# Patient Record
Sex: FEMALE | Race: WHITE | NOT HISPANIC OR LATINO | Employment: OTHER | URBAN - METROPOLITAN AREA
[De-identification: names, ages, dates, MRNs, and addresses within clinical notes are randomized per-mention and may not be internally consistent; named-entity substitution may affect disease eponyms.]

---

## 2017-03-09 ENCOUNTER — HISTORICAL (OUTPATIENT)
Dept: ADMINISTRATIVE | Facility: HOSPITAL | Age: 62
End: 2017-03-09

## 2017-03-10 ENCOUNTER — HISTORICAL (OUTPATIENT)
Dept: ADMINISTRATIVE | Facility: HOSPITAL | Age: 62
End: 2017-03-10

## 2017-03-22 ENCOUNTER — HISTORICAL (OUTPATIENT)
Dept: ADMINISTRATIVE | Facility: HOSPITAL | Age: 62
End: 2017-03-22

## 2017-04-28 ENCOUNTER — HISTORICAL (OUTPATIENT)
Dept: ADMINISTRATIVE | Facility: HOSPITAL | Age: 62
End: 2017-04-28

## 2017-06-09 ENCOUNTER — HISTORICAL (OUTPATIENT)
Dept: ADMINISTRATIVE | Facility: HOSPITAL | Age: 62
End: 2017-06-09

## 2017-06-21 ENCOUNTER — HISTORICAL (OUTPATIENT)
Dept: ADMINISTRATIVE | Facility: HOSPITAL | Age: 62
End: 2017-06-21

## 2018-02-15 ENCOUNTER — HISTORICAL (OUTPATIENT)
Dept: ADMINISTRATIVE | Facility: HOSPITAL | Age: 63
End: 2018-02-15

## 2018-10-06 ENCOUNTER — HISTORICAL (OUTPATIENT)
Dept: ADMINISTRATIVE | Facility: HOSPITAL | Age: 63
End: 2018-10-06

## 2019-01-01 ENCOUNTER — HISTORICAL (OUTPATIENT)
Dept: ADMINISTRATIVE | Facility: HOSPITAL | Age: 64
End: 2019-01-01

## 2019-01-01 LAB
ABS NEUT (OLG): 3.01 X10(3)/MCL (ref 2.1–9.2)
ALBUMIN SERPL-MCNC: 3.5 GM/DL (ref 3.4–5)
ALBUMIN/GLOB SERPL: 0.9 {RATIO}
ALP SERPL-CCNC: 97 UNIT/L (ref 38–126)
ALT SERPL-CCNC: 26 UNIT/L (ref 12–78)
APTT PPP: 72.1 SECOND(S) (ref 24.8–36.9)
AST SERPL-CCNC: 20 UNIT/L (ref 15–37)
BASOPHILS # BLD AUTO: 0 X10(3)/MCL (ref 0–0.2)
BASOPHILS NFR BLD AUTO: 0 %
BILIRUB SERPL-MCNC: 1.5 MG/DL (ref 0.2–1)
BILIRUBIN DIRECT+TOT PNL SERPL-MCNC: 0.3 MG/DL (ref 0–0.2)
BILIRUBIN DIRECT+TOT PNL SERPL-MCNC: 1.2 MG/DL (ref 0–0.8)
BUN SERPL-MCNC: 13 MG/DL (ref 7–18)
CALCIUM SERPL-MCNC: 9.8 MG/DL (ref 8.5–10.1)
CHLORIDE SERPL-SCNC: 104 MMOL/L (ref 98–107)
CO2 SERPL-SCNC: 26 MMOL/L (ref 21–32)
CREAT SERPL-MCNC: 0.93 MG/DL (ref 0.55–1.02)
EOSINOPHIL # BLD AUTO: 0.1 X10(3)/MCL (ref 0–0.9)
EOSINOPHIL NFR BLD AUTO: 1 %
ERYTHROCYTE [DISTWIDTH] IN BLOOD BY AUTOMATED COUNT: 12.9 % (ref 11.5–17)
GLOBULIN SER-MCNC: 3.7 GM/DL (ref 2.4–3.5)
GLUCOSE SERPL-MCNC: 101 MG/DL (ref 74–106)
HCT VFR BLD AUTO: 43.5 % (ref 37–47)
HGB BLD-MCNC: 14.2 GM/DL (ref 12–16)
INR PPP: 1.5 (ref 0–1.3)
LYMPHOCYTES # BLD AUTO: 1.2 X10(3)/MCL (ref 0.6–4.6)
LYMPHOCYTES NFR BLD AUTO: 25 %
MCH RBC QN AUTO: 31.3 PG (ref 27–31)
MCHC RBC AUTO-ENTMCNC: 32.6 GM/DL (ref 33–36)
MCV RBC AUTO: 96 FL (ref 80–94)
MONOCYTES # BLD AUTO: 0.3 X10(3)/MCL (ref 0.1–1.3)
MONOCYTES NFR BLD AUTO: 7 %
NEUTROPHILS # BLD AUTO: 3.01 X10(3)/MCL (ref 2.1–9.2)
NEUTROPHILS NFR BLD AUTO: 66 %
PLATELET # BLD AUTO: 171 X10(3)/MCL (ref 130–400)
PMV BLD AUTO: 8.9 FL (ref 9.4–12.4)
POC TROPONIN: 0.02 NG/ML (ref 0–0.08)
POC TROPONIN: 0.02 NG/ML (ref 0–0.08)
POTASSIUM SERPL-SCNC: 3.8 MMOL/L (ref 3.5–5.1)
PROT SERPL-MCNC: 7.2 GM/DL (ref 6.4–8.2)
PROTHROMBIN TIME: 18.6 SECOND(S) (ref 12.2–14.7)
RBC # BLD AUTO: 4.53 X10(6)/MCL (ref 4.2–5.4)
SODIUM SERPL-SCNC: 138 MMOL/L (ref 136–145)
TROPONIN I SERPL-MCNC: 0.02 NG/ML (ref 0.02–0.49)
WBC # SPEC AUTO: 4.6 X10(3)/MCL (ref 4.5–11.5)

## 2019-05-06 ENCOUNTER — HISTORICAL (OUTPATIENT)
Dept: ADMINISTRATIVE | Facility: HOSPITAL | Age: 64
End: 2019-05-06

## 2019-12-16 ENCOUNTER — HISTORICAL (OUTPATIENT)
Dept: ADMINISTRATIVE | Facility: HOSPITAL | Age: 64
End: 2019-12-16

## 2019-12-17 ENCOUNTER — HISTORICAL (OUTPATIENT)
Dept: ADMINISTRATIVE | Facility: HOSPITAL | Age: 64
End: 2019-12-17

## 2021-03-15 ENCOUNTER — HISTORICAL (OUTPATIENT)
Dept: LAB | Facility: HOSPITAL | Age: 66
End: 2021-03-15

## 2022-03-28 ENCOUNTER — HISTORICAL (OUTPATIENT)
Dept: ADMINISTRATIVE | Facility: HOSPITAL | Age: 67
End: 2022-03-28

## 2022-04-06 ENCOUNTER — HISTORICAL (OUTPATIENT)
Dept: ADMINISTRATIVE | Facility: HOSPITAL | Age: 67
End: 2022-04-06

## 2022-04-06 ENCOUNTER — HISTORICAL (OUTPATIENT)
Dept: RADIOLOGY | Facility: HOSPITAL | Age: 67
End: 2022-04-06

## 2022-04-07 ENCOUNTER — HISTORICAL (OUTPATIENT)
Dept: ADMINISTRATIVE | Facility: HOSPITAL | Age: 67
End: 2022-04-07
Payer: MEDICARE

## 2022-04-21 ENCOUNTER — HISTORICAL (OUTPATIENT)
Dept: RADIOLOGY | Facility: HOSPITAL | Age: 67
End: 2022-04-21
Payer: MEDICARE

## 2022-04-23 VITALS
DIASTOLIC BLOOD PRESSURE: 80 MMHG | HEIGHT: 67 IN | SYSTOLIC BLOOD PRESSURE: 148 MMHG | OXYGEN SATURATION: 96 % | BODY MASS INDEX: 45.99 KG/M2 | WEIGHT: 293 LBS

## 2022-04-30 NOTE — ED PROVIDER NOTES
Patient:   Nay Doe            MRN: [id number]            FIN: [id number]               Age:   63 years     Sex:  Female     :  1955   Associated Diagnoses:   Dyspnea   Author:   Kassie SHARMA, Yousuf Bush      Basic Information   Time seen: Date & time 2019 11:34:00.   History source: EMS.   Arrival mode: Ambulance.   History limitation: None.   Additional information: Patient's physician(s): Bailey SHARMA, Guicho JACKSON.      History of Present Illness   The patient presents with 64y/o F presents to the ED with SOB onset 6 AM. Denies CP. Janet fnp-c and     I, Dr. Fernando, assumed care of this patient upon walking into the room at 1145.  62 y/o  female with hx of AFib, HTN and HLD presents to the ED c/o SOB and palpitations which  began around 0530 this morning and lasted for approximately one hour. Pt also states she has had a cough with green sputum for about one month. Pt denies any chest pain and states that she feels better now. .  The onset was 6  hours ago.  The course/duration of symptoms is improving.  Degree at onset moderate.  Degree at present none.  The Exacerbating factors is exertion.  The Relieving factors is none.  Risk factors consist of hypertension.  Prior episodes: occasional.  Therapy today: emergency medical services.  Associated symptoms: cough.  Additional history: none.        Review of Systems   Constitutional symptoms:  No fever, no chills, no sweats, no weakness, no fatigue.    Skin symptoms:  No rash, no lesion.    Respiratory symptoms:  Shortness of breath, cough, No hemoptysis, , Sputum production: Green.    Cardiovascular symptoms:  Palpitations, no chest pain, no syncope, no peripheral edema.    Gastrointestinal symptoms:  No abdominal pain, no nausea, no vomiting, no diarrhea.    Genitourinary symptoms:  No dysuria, no hematuria.    Musculoskeletal symptoms:  No back pain,    Neurologic symptoms:  No headache, no dizziness, no altered level of  consciousness, no numbness, no tingling, no weakness.              Additional review of systems information: All other systems reviewed and otherwise negative.      Health Status   Allergies:    Allergic Reactions (Selected)  Severity Not Documented  Iodine- No reactions were documented.  Lortab- No reactions were documented.  Sulfamethoxazole- No reactions were documented..   Medications:  (Selected)   Prescriptions  Prescribed  Protonix 40 mg ORAL enteric coated tablet: 40 mg = 1 tab(s), Oral, Daily, # 30 tab(s), 0 Refill(s)  nitroglycerin 0.4 mg sublingual TAB: 0.4 mg = 1 tab(s), SL, q5min, PRN PRN chest pain, # 100 tab(s), 0 Refill(s)  Documented Medications  Documented  Amox Tr-K Clv 875-125 Mg Tab: = 1 tab(s), Oral, BID  Carisoprodol 350 Mg Tablet: 350 mg = 1 tab(s), Oral, BID  Crestor 10 mg oral tablet: 10 mg = 1 tab(s), Oral, At Bedtime, # 30 tab(s), 0 Refill(s)  Doxycycline Hyclate 100 Mg Cap: 100 mg = 1 cap(s), Oral, BID  HYDROCO/APAP TAB 7.5-325: 1 tab(s), Oral, BID  Hctz 12.5mg Cap: 12.5 mg = 1 cap(s), Oral, Daily  Hydroxychloroquine 200 Mg Tab: 200 mg = 1 tab(s), Oral, Daily  Ibuprofen 600 Mg Tablet: 600 mg = 1 tab(s), Oral, TID  Losartan Potassium 50 Mg Tab: 50 mg = 1 tab(s), Oral, Daily  MUPIROCIN 2 % OINT: TOP, TID  Magnesium Oxide 400 Mg Tablet: 400 mg = 1 tab(s), Oral, qPM  OFLOXACIN .3 % SOLN:   PREDNISONE 5 MG TABS: 5 mg = 1 tab(s), Oral, Daily  aspirin 81 mg oral Delayed Release (EC) tablet: 324 mg = 4 tab(s), Oral, Daily, 0 Refill(s)  propafenone 225 mg oral tablet: 225 mg = 1 tab(s), Oral, q8hr, # 90 tab(s), 0 Refill(s).   Immunizations: Unknown.      Past Medical/ Family/ Social History   Medical history:    Active  ATRIAL FIBRILLATION (427.31)  Dyslipidemia (272.4)  HYPERTENSION (997.91)  CPAP (continuous positive airway pressure) dependence ([id number])  Colon cancer screening (4885357990)  Lung nodules ([id number])  Morbid obesity  (902544742)  Allergic rhinitis (711391765)  Depression (977572688)  Elbow pain (598309642)  Wrist pain (15787850)  Shoulder pain (58913649)  Resolved  Osteoarthritis (7544160390):  Resolved.  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  Sleep apnea (263516542):  Resolved.  Anxiety (00176084):  Resolved.  Depression (603057612):  Resolved..   Surgical history:    Colonoscopy on 6/14/2017 at 62 Years.  Comments:  6/14/2017 11:35 - Marcelle Neal RN  auto-populated from documented surgical case  Left Heart Cath w/COR Angio Ventriculogr (None) on 6/11/2014 at 59 Years.  Comments:  6/11/2014 07:36 - Ceasar Rodriguez RN  auto-populated from documented surgical case  Cholecystectomy (73472937).  Appendectomy (003861257).  Ablation (598534693).  Stripping of lower limb varicose veins (56037581)..   Family history:    Diabetes mellitus type 2  Mother  Sister  Stroke  Father  Lymphoma  Mother  Disorder of breast, unspecified  Grandfather  Hypertension.  Father  Mother  Depression.  Other (Aunt)  Hyperlipidemia.  Father  Mother  Melanoma                                                                                                                                                                                                                                09-AUG-2016 15:25:48<$>  Father  .   Social history: Alcohol use: Denies, Tobacco use: Denies, Drug use: Denies, Occupation: On disability, Family/social situation: Lives with relative(s).   Problem list:    Active Problems (14)  Allergic rhinitis   At risk of healthcare associated infection   At risk of pressure sore   ATRIAL FIBRILLATION   Colon cancer screening   CPAP (continuous positive airway pressure) dependence   Depression   Dyslipidemia   Elbow pain   HYPERTENSION   Lung nodules   Morbid obesity   Shoulder pain   Wrist pain .      Physical Examination               Vital Signs   Vital Signs   1/1/2019 11:55 CST       Peripheral Pulse Rate     55 bpm  LOW                              Respiratory Rate          18 br/min                             SpO2                      95 %    1/1/2019 11:30 CST       Temperature Oral          36.8 DegC                             Temperature Oral (calculated)             98.24 DegF                             Peripheral Pulse Rate     55 bpm  LOW                             Respiratory Rate          18 br/min                             SpO2                      98 %                             Oxygen Therapy            Room air                             Systolic Blood Pressure   153 mmHg  HI                             Diastolic Blood Pressure  55 mmHg  LOW  .   Measurements   1/1/2019 11:30 CST       Weight Dosing             150 kg                             Weight Measured and Calculated in Lbs     330.69 lb                             Weight Estimated          150 kg                             Height/Length Dosing      170 cm                             Height/Length Estimated   170 cm                             Body Mass Index Estimated 51.9 kg/m2  .   Basic Oxygen Information   1/1/2019 11:55 CST       SpO2                      95 %    1/1/2019 11:30 CST       SpO2                      98 %                             Oxygen Therapy            Room air  .   General:  No acute distress.   Skin:  Warm, dry, no rash.    Neck:  Supple.   Cardiovascular:  Regular rate and rhythm, No murmur, Normal peripheral perfusion, No edema.    Respiratory:  Breath sounds are equal, Symmetrical chest wall expansion, Respirations: Regular, Breath sounds: Clear.    Chest wall:  No tenderness, No deformity.    Back:  Nontender, Normal range of motion.    Musculoskeletal:  No swelling, no deformity.    Gastrointestinal:  Soft, Nontender, Non distended, Normal bowel sounds.    Neurological:  Alert and oriented to person, place, time, and situation, No focal neurological deficit observed, CN II-XII intact, normal sensory observed, normal motor  observed, normal speech observed, normal coordination observed.    Psychiatric:  Cooperative, appropriate mood & affect.       Medical Decision Making   Differential Diagnosis:  Pneumonia, congestive heart failure, asthma.    Documents reviewed:  Emergency department nurses' notes.   Orders  Launch Orders   Laboratory:  POC BNP iSTAT request (Order): Blood, Routine collect, 1/1/2019 11:35 CST by Margaret Gold Lab Collect, Print Label By Order Location  Troponin-I (Order): Stat collect, 1/1/2019 11:35 CST, Blood, Lab Collect, Print Label By Order Location, 1/1/2019 11:35 CST  POC iSTAT ER Troponin request (Order): Blood, Stat collect, 1/1/2019 11:35 CST by Margaret Gold Lab Collect, Print Label By Order Location  CMP (Order): Stat collect, 1/1/2019 11:35 CST, Blood, Lab Collect, Print Label By Order Location, 1/1/2019 11:35 CST  CBC w/ Auto Diff (Order): Now collect, 1/1/2019 11:35 CST, Blood, Lab Collect, Print Label By Order Location, 1/1/2019 11:35 CST  Radiology:  XR Chest 2 Views (Order): Stat, 1/1/2019 11:35 CST, Dyspnea, None, Patient Bed, Patient Has IV?, Rad Type, Not Scheduled  Cardiology:  EKG Adult 12 Lead (Order): 1/1/2019 11:35 CST, Stat, Once, 1/1/2019 11:35 CST, Patient Bed, Patient Has IV, Standard Precautions, -1, -1, 1/1/2019 11:35 CST.   Electrocardiogram:  Time 1/1/2019 11:37:00, rate 54, No ST-T changes, no ectopy, normal ME & QRS intervals, EP Interp, The Rhythm is atrial fibrillation.  .    Results review:  Lab results : Lab View   1/1/2019 15:21 CST       POC Troponin              0.02 ng/mL    1/1/2019 12:14 CST       POC Troponin              0.02 ng/mL    1/1/2019 11:49 CST       Sodium Lvl                138 mmol/L                             Potassium Lvl             3.8 mmol/L                             Chloride                  104 mmol/L                             CO2                       26.0 mmol/L                             Calcium Lvl                9.8 mg/dL                             Glucose Lvl               101 mg/dL                             BUN                       13.0 mg/dL                             Creatinine                0.93 mg/dL                             eGFR-AA                   >60 mL/min/1.73 m2  NA                             eGFR-KEESHA                  >60 mL/min/1.73 m2  NA                             Bili Total                1.5 mg/dL  HI                             Bili Direct               0.30 mg/dL  HI                             Bili Indirect             1.20 mg/dL  HI                             AST                       20 unit/L                             ALT                       26 unit/L                             Alk Phos                  97 unit/L                             Total Protein             7.2 gm/dL                             Albumin Lvl               3.50 gm/dL                             Globulin                  3.70 gm/dL  HI                             A/G Ratio                 0.9  NA                             Troponin-I                0.02 ng/mL                             PT                        18.6 second(s)  HI                             INR                       1.5  HI                             PTT                       72.1 second(s)  HI                             WBC                       4.6 x10(3)/mcL                             RBC                       4.53 x10(6)/mcL                             Hgb                       14.2 gm/dL                             Hct                       43.5 %                             Platelet                  171 x10(3)/mcL                             MCV                       96.0 fL  HI                             MCH                       31.3 pg  HI                             MCHC                      32.6 gm/dL  LOW                             RDW                       12.9 %                             MPV                       8.9 fL   LOW                             Abs Neut                  3.01 x10(3)/mcL                             Neutro Auto               66 %  NA                             Lymph Auto                25 %  NA                             Mono Auto                 7 %  NA                             Eos Auto                  1 %  NA                             Abs Eos                   0.1 x10(3)/mcL                             Basophil Auto             0 %  NA                             Abs Neutro                3.01 x10(3)/mcL                             Abs Lymph                 1.2 x10(3)/mcL                             Abs Mono                  0.3 x10(3)/mcL                             Abs Baso                  0.0 x10(3)/mcL  .   Radiology results:  Rad Results (ST)  < 12 hrs   Accession: [id number]  Order: XR Chest 2 Views  Report Dt/Tm: 01/01/2019 12:18  Report:   Clinical History  Shortness of breath     Technique  2 views of the chest.     Comparison  June 9, 2017     Findings  Prominent interstitial markings with no focal opacification.  The trachea appears midline.  The cardiomediastinal silhouette is within normal limits.  There is no evidence of pneumothorax or pleural effusion.  Visualized abdomen, soft tissues, and osseous structures are  unremarkable.     Impression  No acute cardiopulmonary process.    .      Reexamination/ Reevaluation   Patient seen and examined. Labs and imaging reviewed as above.   Chest x-ray is within normal limits. On re-evaluation, patient reported no dyspnea in the ED.  Patient remained stable during ED evaluation.   Patient had only a brief episode of dyspnea at home during the period of perceived rapid A. fib.  Patient is anticoagulated and heart rate has been within normal in the ED.  Troponins negative x 2. Patient has appointment to see Dr Del Rosario in 3 days.      Impression and Plan   Diagnosis   Dyspnea ([id number] R06.00)   Plan   Disposition: Discharged: to home.    Patient  was given the following educational materials: Shortness of Breath, Adult.    Follow up with: Follow up with Cardiologist Within 1 to 2 days.    Counseled: Patient, Regarding diagnosis, Regarding diagnostic results, Regarding treatment plan, Patient indicated understanding of instructions.    Orders: Launch Orders   Admit/Transfer/Discharge:  Discharge (Order): 1/1/2019 15:41 CST, Home.    Notes: I, Taylor Jeansonne, acted solely as a scribe for and in the presence of Dr. Fernando who performed the service., I acknowledge that the documentation was provided by a scribe on the date of the service noted above and that the documentation in the chart reflects work and decisions made by me alone. .

## 2022-04-30 NOTE — ED PROVIDER NOTES
Patient:   Nay Doe            MRN: [id number]            FIN: [id number]               Age:   63 years     Sex:  Female     :  1955   Associated Diagnoses:   Acute gastritis   Author:   Mario Stephens MD      Basic Information   Time seen: Date & time 10/6/2018 07:51:00.   History source: Patient.   Arrival mode: Private vehicle.   Additional information: Chief Complaint from Nursing Triage Note : Chief Complaint   10/6/2018 7:31 CDT       Chief Complaint           Left rib pain that started Thursday and progressively getting worse. Denies injury  .      History of Present Illness   The patient presents with abdominal pain.  The onset was 2  days ago and unknown.  The course/duration of symptoms is constant.  The character of symptoms is achy and sharp.  The degree at onset was moderate.  The Location of pain at onset was left, upper and abdominal.  The degree at present is severe.  The Location of pain at present is left, upper and abdominal.  Radiating pain: none. The exacerbating factor is eating.  The relieving factor is rest.  Therapy today: none.  Risk factors consist of none.  Associated symptoms: none.  Additional history: sexual history last intake: 1  day(s) ago.  Patient reports prior EPISODES OF THIS PAIN.  BUT OF LESS INTENSITY.  LAST EPISODE ABOUT A MONTH AGO.        Review of Systems   Constitutional symptoms:  Negative except as documented in HPI.   Skin symptoms:  Negative except as documented in HPI.   Eye symptoms:  Negative except as documented in HPI.   ENMT symptoms:  Negative except as documented in HPI.   Respiratory symptoms:  Negative except as documented in HPI.   Cardiovascular symptoms:  Negative except as documented in HPI.   Gastrointestinal symptoms:  Negative except as documented in HPI.   Genitourinary symptoms:  Negative except as documented in HPI.   Musculoskeletal symptoms:  Negative except as documented in HPI.   Neurologic symptoms:  Negative  except as documented in HPI.   Psychiatric symptoms:  Negative except as documented in HPI.   Endocrine symptoms:  Negative except as documented in HPI.   Hematologic/Lymphatic symptoms:  Negative except as documented in HPI.   Allergy/immunologic symptoms:  Negative except as documented in HPI.             Additional review of systems information: All other systems reviewed and otherwise negative.      Health Status   Allergies:    Allergic Reactions (All)  Severity Not Documented  Iodine- No reactions were documented.  Lortab- No reactions were documented.  Sulfamethoxazole- No reactions were documented.,    Allergies (3) Active Reaction  iodine None Documented  Lortab None Documented  sulfamethoxazole None Documented  .   Medications:  (Selected)   Prescriptions  Prescribed  nitroglycerin 0.4 mg sublingual TAB: 0.4 mg = 1 tab(s), SL, q5min, PRN PRN chest pain, # 100 tab(s), 0 Refill(s)  Documented Medications  Documented  Amox Tr-K Clv 875-125 Mg Tab: = 1 tab(s), Oral, BID  Carisoprodol 350 Mg Tablet: 350 mg = 1 tab(s), Oral, BID  Crestor 10 mg oral tablet: 10 mg = 1 tab(s), Oral, At Bedtime, # 30 tab(s), 0 Refill(s)  Doxycycline Hyclate 100 Mg Cap: 100 mg = 1 cap(s), Oral, BID  HYDROCO/APAP TAB 7.5-325: 1 tab(s), Oral, BID  Hctz 12.5mg Cap: 12.5 mg = 1 cap(s), Oral, Daily  Hydroxychloroquine 200 Mg Tab: 200 mg = 1 tab(s), Oral, Daily  Ibuprofen 600 Mg Tablet: 600 mg = 1 tab(s), Oral, TID  Losartan Potassium 50 Mg Tab: 50 mg = 1 tab(s), Oral, Daily  MUPIROCIN 2 % OINT: TOP, TID  Magnesium Oxide 400 Mg Tablet: 400 mg = 1 tab(s), Oral, qPM  OFLOXACIN .3 % SOLN:   PREDNISONE 5 MG TABS: 5 mg = 1 tab(s), Oral, Daily  aspirin 81 mg oral Delayed Release (EC) tablet: 324 mg = 4 tab(s), Oral, Daily, 0 Refill(s)  propafenone 225 mg oral tablet: 225 mg = 1 tab(s), Oral, q8hr, # 90 tab(s), 0 Refill(s).      Past Medical/ Family/ Social History   Medical history:    Active  ATRIAL FIBRILLATION (427.31)  Dyslipidemia  (272.4)  HYPERTENSION (997.91)  CPAP (continuous positive airway pressure) dependence ([id number])  Colon cancer screening (8185078597)  Lung nodules ([id number])  Morbid obesity (776668690)  Allergic rhinitis (982240919)  Depression (417009888)  Elbow pain (540928709)  Wrist pain (55653579)  Shoulder pain (82347986)  Resolved  Osteoarthritis (7538797463):  Resolved.  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  Sleep apnea (807469189):  Resolved.  Anxiety (05705785):  Resolved.  Depression (787013820):  Resolved..   Surgical history:    Colonoscopy on 6/14/2017 at 62 Years.  Comments:  6/14/2017 11:35 - Marcelle Neal RN  auto-populated from documented surgical case  Left Heart Cath w/COR Angio Ventriculogr (None) on 6/11/2014 at 59 Years.  Comments:  6/11/2014 07:36 - Ceasar Rodriguez RN  auto-populated from documented surgical case  Cholecystectomy (01193448).  Appendectomy (444583528).  Ablation (334883514).  Stripping of lower limb varicose veins (46561544)..   Family history:    Diabetes mellitus type 2  Mother  Sister  Stroke  Father  Lymphoma  Mother  Disorder of breast, unspecified  Grandfather  Hypertension.  Father  Mother  Depression.  Other (Aunt)  Hyperlipidemia.  Father  Mother  Melanoma                                                                                                                                                                                                                                09-AUG-2016 15:25:48<$>  Father  .   Social history:    Social & Psychosocial Habits    Alcohol  05/04/2014 Risk Assessment: Denies Alcohol Use    02/27/2016  Use: Never    Employment/School  06/06/2017  Status: Disabled w/ 10th grade education    Exercise    Comment: Walks w/ cane - 06/06/2017 14:15 - Ingrid Giron LPN    Home/Environment  06/06/2017  Lives with: Son    Living situation: Home/Independent    Home equipment: Glucose  monitoring, Walker/Cane    Alcohol abuse in household: No    Substance abuse in household: No    Smoker in household: No    Injuries/Abuse/Neglect in household: No    Feels unsafe at home: No    Safe place to go: Yes    Agency(s)/Others notified: No    Family/Friends available to help: Yes    Concern for family members at home: No    Major illness in household: No    Financial concerns: No    Concerns over TV/Computer/Game use: No    Nutrition/Health  06/06/2017  Type of diet: Regular    Substance Abuse  05/04/2014 Risk Assessment: Denies Substance Abuse    02/27/2016  Use: Never    Tobacco  05/04/2014 Risk Assessment: Denies Tobacco Use    02/27/2016  Use: Never smoker  .   Problem list:    Active Problems (14)  Allergic rhinitis   At risk of healthcare associated infection   At risk of pressure sore   ATRIAL FIBRILLATION   Colon cancer screening   CPAP (continuous positive airway pressure) dependence   Depression   Dyslipidemia   Elbow pain   HYPERTENSION   Lung nodules   Morbid obesity   Shoulder pain   Wrist pain   .      Physical Examination               Vital Signs   Vital Signs   10/6/2018 7:31 CDT       Temperature Temporal Artery               36.1 DegC  LOW                             Peripheral Pulse Rate     62 bpm                             Respiratory Rate          22 br/min                             SpO2                      96 %                             Oxygen Therapy            Room air                             Systolic Blood Pressure   169 mmHg  HI                             Diastolic Blood Pressure  74 mmHg  .      Vital Signs (last 24 hrs)_____  Last Charted___________  Heart Rate Peripheral   62 bpm  (OCT 06 07:31)  Resp Rate         13 br/min  (OCT 06 09:08)  SBP      H 148mmHg  (OCT 06 09:08)  DBP      68 mmHg  (OCT 06 09:08)  SpO2      97 %  (OCT 06 09:08)  Weight      156.3 kg  (OCT 06 07:31)  .   Measurements   10/6/2018 7:31 CDT       Weight Dosing             156.3 kg                              Weight Measured           156.3 kg                             Weight Measured and Calculated in Lbs     344.58 lb                             Height/Length Dosing      170 cm                             Height/Length Estimated   170 cm  .   Oxygen saturation.   General:  Alert, mild distress.    Jane coma scale:  Per nurse's notes.   Neurological:  Alert and oriented to person, place, time, and situation, normal motor observed, normal speech observed.    Skin:  Warm, dry, pink, intact, normal for ethnicity.    Head:  Normocephalic, atraumatic.    Neck:  Trachea midline.   Eye:  Pupils are equal, round and reactive to light, extraocular movements are intact, normal conjunctiva, vision grossly normal.    Cardiovascular:  Regular rate and rhythm, Normal peripheral perfusion.    Respiratory:  Lungs are clear to auscultation, respirations are non-labored, breath sounds are equal, Symmetrical chest wall expansion.    Chest wall:  No tenderness.   Back:  No step-offs.   Musculoskeletal:  Normal ROM.   Gastrointestinal:  Soft, Non distended, Normal bowel sounds, Obese, Tenderness: Epigastric.    Psychiatric:  Cooperative, appropriate mood & affect.       Medical Decision Making   Differential Diagnosis:  Pancreatitis, irritable bowel syndrome, gastroesophageal reflux disease, peptic ulcer disease, gastritis, diverticulitis.    Rationale:  Patient's laboratory evaluation for abdominal nose unremarkable.  Abdomen symptoms are recurrent..   Results review:     No qualifying data available.      Impression and Plan   Diagnosis   Acute gastritis (ZUX08-GU K29.00)   Plan   Condition: Improved.    Disposition: Discharged: Time  10/6/2018 10:40:00, to home.    Prescriptions: Launch prescriptions   Pharmacy:  Protonix 40 mg ORAL enteric coated tablet (Prescribe): 40 mg = 1 tab(s), Oral, Daily, # 30 tab(s), 0 Refill(s).    Follow up with: Primary Care Physician, In: 3  day(s).    Counseled: Patient, Regarding  diagnosis, Regarding treatment plan, Patient indicated understanding of instructions.    Orders: Launch Orders   Admit/Transfer/Discharge:  Discharge (Order): 10/6/2018 10:44 CDT, Home.

## 2022-06-15 DIAGNOSIS — M48.02 SPINAL STENOSIS IN CERVICAL REGION: Primary | ICD-10-CM

## 2022-06-15 DIAGNOSIS — M48.061 SPINAL STENOSIS, LUMBAR REGION, WITHOUT NEUROGENIC CLAUDICATION: ICD-10-CM

## 2022-08-04 ENCOUNTER — HOSPITAL ENCOUNTER (OUTPATIENT)
Dept: RADIOLOGY | Facility: HOSPITAL | Age: 67
Discharge: HOME | End: 2022-08-04
Attending: NURSE PRACTITIONER
Payer: MEDICARE

## 2022-08-04 DIAGNOSIS — R22.0 LOCALIZED SWELLING, MASS AND LUMP, HEAD: ICD-10-CM

## 2022-08-04 PROCEDURE — 70486 CT MAXILLOFACIAL W/O DYE: CPT | Mod: TC

## 2022-08-08 ENCOUNTER — HOSPITAL ENCOUNTER (OUTPATIENT)
Dept: RADIOLOGY | Facility: HOSPITAL | Age: 67
Discharge: HOME | End: 2022-08-08
Attending: NURSE PRACTITIONER
Payer: MEDICARE

## 2022-08-08 DIAGNOSIS — M48.02 SPINAL STENOSIS IN CERVICAL REGION: ICD-10-CM

## 2022-08-08 DIAGNOSIS — M48.061 SPINAL STENOSIS, LUMBAR REGION, WITHOUT NEUROGENIC CLAUDICATION: ICD-10-CM

## 2022-08-08 PROCEDURE — 72125 CT NECK SPINE W/O DYE: CPT | Mod: TC

## 2022-08-08 PROCEDURE — 72131 CT LUMBAR SPINE W/O DYE: CPT | Mod: TC

## 2022-10-21 ENCOUNTER — HOSPITAL ENCOUNTER (EMERGENCY)
Facility: HOSPITAL | Age: 67
Discharge: LEFT BEFORE DISCHARGE | End: 2022-10-21
Payer: MEDICARE

## 2022-10-21 VITALS
SYSTOLIC BLOOD PRESSURE: 184 MMHG | HEART RATE: 68 BPM | WEIGHT: 293 LBS | DIASTOLIC BLOOD PRESSURE: 74 MMHG | BODY MASS INDEX: 45.99 KG/M2 | HEIGHT: 67 IN | TEMPERATURE: 98 F | RESPIRATION RATE: 16 BRPM | OXYGEN SATURATION: 98 %

## 2022-10-21 PROCEDURE — 25000003 PHARM REV CODE 250: Performed by: NURSE PRACTITIONER

## 2022-10-21 PROCEDURE — 99900041 HC LEFT WITHOUT BEING SEEN- EMERGENCY

## 2022-10-21 RX ORDER — ACETAMINOPHEN 500 MG
1000 TABLET ORAL
Status: COMPLETED | OUTPATIENT
Start: 2022-10-21 | End: 2022-10-21

## 2022-10-21 RX ADMIN — ACETAMINOPHEN 1000 MG: 500 TABLET, FILM COATED ORAL at 01:10

## 2023-01-11 ENCOUNTER — HOSPITAL ENCOUNTER (EMERGENCY)
Facility: HOSPITAL | Age: 68
Discharge: HOME | End: 2023-01-11
Attending: EMERGENCY MEDICINE
Payer: MEDICARE

## 2023-01-11 VITALS
SYSTOLIC BLOOD PRESSURE: 144 MMHG | TEMPERATURE: 98 F | OXYGEN SATURATION: 92 % | WEIGHT: 293 LBS | BODY MASS INDEX: 45.99 KG/M2 | HEART RATE: 60 BPM | RESPIRATION RATE: 23 BRPM | HEIGHT: 67 IN | DIASTOLIC BLOOD PRESSURE: 67 MMHG

## 2023-01-11 DIAGNOSIS — R07.89 CHEST WALL PAIN: ICD-10-CM

## 2023-01-11 DIAGNOSIS — R07.9 CHEST PAIN: ICD-10-CM

## 2023-01-11 DIAGNOSIS — R07.81 PLEURODYNIA: Primary | ICD-10-CM

## 2023-01-11 LAB
APTT PPP: 42.2 SECONDS (ref 23.2–33.7)
BASOPHILS # BLD AUTO: 0.03 X10(3)/MCL (ref 0–0.2)
BASOPHILS NFR BLD AUTO: 0.4 %
D DIMER PPP IA.FEU-MCNC: 0.41 UG/ML FEU (ref 0–0.5)
EOSINOPHIL # BLD AUTO: 0.06 X10(3)/MCL (ref 0–0.9)
EOSINOPHIL NFR BLD AUTO: 0.8 %
ERYTHROCYTE [DISTWIDTH] IN BLOOD BY AUTOMATED COUNT: 12.9 % (ref 11.5–17)
HCT VFR BLD AUTO: 42.5 % (ref 37–47)
HGB BLD-MCNC: 13.8 GM/DL (ref 12–16)
IMM GRANULOCYTES # BLD AUTO: 0.02 X10(3)/MCL (ref 0–0.04)
IMM GRANULOCYTES NFR BLD AUTO: 0.3 %
INR BLD: 2.25 (ref 0–1.3)
LYMPHOCYTES # BLD AUTO: 1.05 X10(3)/MCL (ref 0.6–4.6)
LYMPHOCYTES NFR BLD AUTO: 14.5 %
MCH RBC QN AUTO: 31.4 PG
MCHC RBC AUTO-ENTMCNC: 32.5 MG/DL (ref 33–36)
MCV RBC AUTO: 96.6 FL (ref 80–94)
MONOCYTES # BLD AUTO: 0.62 X10(3)/MCL (ref 0.1–1.3)
MONOCYTES NFR BLD AUTO: 8.6 %
NEUTROPHILS # BLD AUTO: 5.46 X10(3)/MCL (ref 2.1–9.2)
NEUTROPHILS NFR BLD AUTO: 75.4 %
PLATELET # BLD AUTO: 162 X10(3)/MCL (ref 130–400)
PMV BLD AUTO: 8.7 FL (ref 7.4–10.4)
PROTHROMBIN TIME: 24.4 SECONDS (ref 12.5–14.5)
RBC # BLD AUTO: 4.4 X10(6)/MCL (ref 4.2–5.4)
TROPONIN I SERPL-MCNC: <0.01 NG/ML (ref 0–0.04)
WBC # SPEC AUTO: 7.2 X10(3)/MCL (ref 4.5–11.5)

## 2023-01-11 PROCEDURE — 85730 THROMBOPLASTIN TIME PARTIAL: CPT | Performed by: EMERGENCY MEDICINE

## 2023-01-11 PROCEDURE — 85025 COMPLETE CBC W/AUTO DIFF WBC: CPT | Performed by: EMERGENCY MEDICINE

## 2023-01-11 PROCEDURE — 84484 ASSAY OF TROPONIN QUANT: CPT | Performed by: EMERGENCY MEDICINE

## 2023-01-11 PROCEDURE — 93010 EKG 12-LEAD: ICD-10-PCS | Mod: ,,, | Performed by: INTERNAL MEDICINE

## 2023-01-11 PROCEDURE — 93010 ELECTROCARDIOGRAM REPORT: CPT | Mod: ,,, | Performed by: INTERNAL MEDICINE

## 2023-01-11 PROCEDURE — 85610 PROTHROMBIN TIME: CPT | Performed by: EMERGENCY MEDICINE

## 2023-01-11 PROCEDURE — 63600175 PHARM REV CODE 636 W HCPCS: Performed by: EMERGENCY MEDICINE

## 2023-01-11 PROCEDURE — 99285 EMERGENCY DEPT VISIT HI MDM: CPT | Mod: 25

## 2023-01-11 PROCEDURE — 96374 THER/PROPH/DIAG INJ IV PUSH: CPT

## 2023-01-11 PROCEDURE — 36415 COLL VENOUS BLD VENIPUNCTURE: CPT | Performed by: EMERGENCY MEDICINE

## 2023-01-11 PROCEDURE — 85379 FIBRIN DEGRADATION QUANT: CPT | Performed by: EMERGENCY MEDICINE

## 2023-01-11 PROCEDURE — 93005 ELECTROCARDIOGRAM TRACING: CPT

## 2023-01-11 RX ORDER — KETOROLAC TROMETHAMINE 30 MG/ML
15 INJECTION, SOLUTION INTRAMUSCULAR; INTRAVENOUS
Status: COMPLETED | OUTPATIENT
Start: 2023-01-11 | End: 2023-01-11

## 2023-01-11 RX ORDER — HYDROCODONE BITARTRATE AND ACETAMINOPHEN 5; 325 MG/1; MG/1
1 TABLET ORAL EVERY 6 HOURS PRN
Qty: 16 TABLET | Refills: 0 | Status: SHIPPED | OUTPATIENT
Start: 2023-01-11

## 2023-01-11 RX ORDER — ASPIRIN 325 MG
325 TABLET ORAL
Status: DISCONTINUED | OUTPATIENT
Start: 2023-01-11 | End: 2023-01-11

## 2023-01-11 RX ADMIN — KETOROLAC TROMETHAMINE 15 MG: 30 INJECTION, SOLUTION INTRAMUSCULAR; INTRAVENOUS at 07:01

## 2023-01-11 NOTE — DISCHARGE INSTRUCTIONS
The narcotic may make you drowsy or woozy be careful when  You take it.      Continue previous medications and care    Return if you have any emergency problem     Final x-ray interpretation will be within the next 12 hours    final x-ray reading will be within the next 12 hours

## 2023-01-11 NOTE — ED PROVIDER NOTES
Encounter Date: 2023       History     Chief Complaint   Patient presents with    Chest Pain     Pt c/o rt chest wall pain radiating to back since she inhaled a bug yesterday. Pt states pain worse with deep breath.     Patient presents the emergency department by private vehicle complaining of right neck and right chest wall pain.  Pruritic in nature she says that yesterday morning while going outside proximally 10/10/2030.  She believes she may swallow a small bug she coughed a little bit.  But last night she started having this pain in her right chest and right neck.  The pain did not start until 13 30 in the afternoon after possible ingestion of the bug at 10 in the morning but it got progressively worse last night and this morning she describes it as a sharp pain whenever she takes a deep breath.  Patient has a history of a blood clot in her leg and she is anticoagulated on Coumadin.      Primary healthcare provider Dariana Iglesias.  Cardiologist Dr. Avalos.      Social history lives with her son retired and disabled she does not use tobacco alcohol no drugs.      Medical history diabetes hypertension tachycardia is requiring ablation history of a heart murmur.  Obesity hypercholesterolemia.  A small heart attack it did not require stents.      Vaccination history no COVID no pneumonia no flu tetanus less than 5 years.      Surgical history cholecystectomy appendectomy cardiac ablation x1.      OB history  6 para 4 SAB 2 status post menopause.      Family history mother is  with cancer dad had cancer and SVT and strokes.  He is     Review of patient's allergies indicates:   Allergen Reactions    Iodine and iodide containing products Shortness Of Breath and Rash    Lortab [hydrocodone-acetaminophen] Other (See Comments)     Severe headaches    Sulfa (sulfonamide antibiotics) Nausea Only     Past Medical History:   Diagnosis Date    Allergy     Arthritis     Degenerative disc disease     2  bulging disc    Diabetes mellitus     Heart murmur     Hyperlipidemia     Hypertension     Obesity     Tachycardia     on Rhytmol     Past Surgical History:   Procedure Laterality Date    ABLATION OF DYSRHYTHMIC FOCUS      APPENDECTOMY      CHOLECYSTECTOMY       Family History   Problem Relation Age of Onset    Cancer Mother     Diabetes Mellitus Mother     Rheum arthritis Mother     Heart disease Father     Diabetes Mellitus Sister     Rheum arthritis Sister     Hypertension Brother     Diabetes Mellitus Sister     Rheum arthritis Sister     Rheum arthritis Sister     Hypertension Brother      Social History     Tobacco Use    Smoking status: Never   Substance Use Topics    Alcohol use: No    Drug use: No     Review of Systems   Constitutional:  Negative for fever.   HENT:  Negative for drooling, mouth sores and sore throat.    Eyes:  Negative for photophobia, discharge and visual disturbance.   Cardiovascular:  Positive for chest pain.        Sharp pleuritic chest pain right neck and right upper chest 13 30 yesterday progressively worse gradual in onset   Gastrointestinal:  Negative for diarrhea, nausea and vomiting.   Endocrine: Negative.  Negative for polyphagia and polyuria.   Genitourinary:  Negative for dysuria.   Musculoskeletal:  Negative for back pain.   Skin:  Negative for rash.   Allergic/Immunologic: Negative for environmental allergies.   Neurological:  Negative for weakness.   Hematological:  Does not bruise/bleed easily.   Psychiatric/Behavioral: Negative.     All other systems reviewed and are negative.    Physical Exam     Initial Vitals [01/11/23 0706]   BP Pulse Resp Temp SpO2   (!) 179/77 70 (!) 24 98.3 °F (36.8 °C) 99 %      MAP       --         Physical Exam    Nursing note and vitals reviewed.  Constitutional: She appears well-developed and well-nourished.   Obese female with pear shaped body habitus awake oriented appears to be uncomfortable appears to be slightly anxious   HENT:   Head:  Normocephalic and atraumatic.   Right Ear: Tympanic membrane and external ear normal.   Left Ear: Tympanic membrane and external ear normal.   Nose: Nose normal.   Mouth/Throat: Oropharynx is clear and moist and mucous membranes are normal.   Eyes: EOM are normal. Pupils are equal, round, and reactive to light.   Neck: Neck supple. No thyromegaly present. No tracheal deviation present. No JVD present.   Normal range of motion.  Cardiovascular:  Normal rate, regular rhythm and normal heart sounds.     Exam reveals no gallop and no friction rub.       No murmur heard.  Pulmonary/Chest: Breath sounds normal. No stridor. No respiratory distress. She has no wheezes. She has no rhonchi. She has no rales. She exhibits no tenderness.   Breath sounds are fairly clear on both sides and equal    Palpation of right chest wall does not seem to reproduce the symptoms.   Abdominal: Abdomen is soft. Bowel sounds are normal. She exhibits no distension and no mass. There is no abdominal tenderness.   Genitourinary:    Genitourinary Comments: No CVA tenderness.     Musculoskeletal:         General: No tenderness or edema. Normal range of motion.      Cervical back: Normal range of motion and neck supple.     Lymphadenopathy:     She has no cervical adenopathy.   Neurological: She is alert and oriented to person, place, and time. She has normal strength and normal reflexes. No cranial nerve deficit. GCS score is 15. GCS eye subscore is 4. GCS verbal subscore is 5. GCS motor subscore is 6.   Skin: Skin is warm and dry. Capillary refill takes 2 to 3 seconds. No rash noted.   Psychiatric: She has a normal mood and affect. Her behavior is normal. Judgment and thought content normal.       ED Course   Procedures  Labs Reviewed   CBC WITH DIFFERENTIAL - Abnormal; Notable for the following components:       Result Value    MCV 96.6 (*)     MCHC 32.5 (*)     All other components within normal limits   PROTIME-INR - Abnormal; Notable for the  following components:    PT 24.4 (*)     INR 2.25 (*)     All other components within normal limits   APTT - Abnormal; Notable for the following components:    PTT 42.2 (*)     All other components within normal limits   TROPONIN I - Normal   D DIMER, QUANTITATIVE - Normal   CBC W/ AUTO DIFFERENTIAL    Narrative:     The following orders were created for panel order CBC auto differential.  Procedure                               Abnormality         Status                     ---------                               -----------         ------                     CBC with Differential[[id number]]        Abnormal            Final result                 Please view results for these tests on the individual orders.   COMPREHENSIVE METABOLIC PANEL   B-TYPE NATRIURETIC PEPTIDE     EKG Readings: (Independently Interpreted)   Initial Reading: No STEMI. Rhythm: Paced Rhythm. T Waves Flipped: II, III, AVL, V3, V4, V5 and V6.   EKG is done at 7:23 a.m. 01/11/2023.  60 per completely atrial paced rhythm with prolonged AV conduction some LVH type changes.  There is inverted T-waves in leads 2 3 and AVF.  V3 through V6 inverted T-waves.  No old EKGs available in the system   ECG Results              EKG 12-lead (In process)  Result time 01/11/23 08:35:05      In process by Interface, Lab In [facility] (01/11/23 08:35:05)                   Narrative:    Test Reason : R07.9,    Vent. Rate : 060 BPM     Atrial Rate : 032 BPM     P-R Int : 274 ms          QRS Dur : 112 ms      QT Int : 416 ms       P-R-T Axes : 000 -14 -57 degrees     QTc Int : 416 ms    Atrial-paced rhythm with prolonged AV conduction  Minimal voltage criteria for LVH, may be normal variant  Septal infarct ,age undetermined  T wave abnormality, consider inferior ischemia  T wave abnormality, consider anterolateral ischemia  Abnormal ECG  No previous ECGs available    Referred By: [email or web address]   SELF           Confirmed By:                                   Imaging Results               X-Ray Chest AP Portable (Final result)  Result time 01/11/23 09:50:17      Final result by Reed Hinojosa MD (01/11/23 09:50:17)                   Impression:      1. Borderline cardiomegaly  2. Cardiac device left chest      Electronically signed by: Reed Hinojosa  Date:    01/11/2023  Time:    09:50               Narrative:    EXAMINATION:  XR CHEST AP PORTABLE    CLINICAL HISTORY:  Chest Pain;, .    COMPARISON:  03/28/2022    FINDINGS:  An AP view or more reveals the heart to be borderline enlarged.  The trachea is midline.  A cardiac device is noted to the left chest.  No infiltrate or effusion is seen.  Bony structures are osteopenic.                                       Medications   ketorolac injection 15 mg (15 mg Intravenous Given 1/11/23 0730)     Medical Decision Making:   Initial Assessment:   Obese female who presents with pleuritic chest pain already anticoagulated on Coumadin.  She says she believes she swallowed a small bug yesterday and then started coughing and then the pain came later gradual in onset and progressive.  It is pleuritic in nature is isolated to the right side but she does not feel short of breath she satting 98% she is not tachycardic she does have a pacemaker in place.  Differential Diagnosis:   Pleuritic chest pain, pleurodynia, pleurisy, myocardial infarction, pulmonary embolus, torn muscle, fractured rib, intercostal muscle injury  Clinical Tests:   Lab Tests: Ordered and Reviewed       <> Summary of Lab: Patient is appropriately anticoagulated 2.25 for an INR.  CBC is normal.  D-dimer test is negative.  Troponin is negative.  EKG is mostly paced with inferior changes T-waves.  No acute ST segment elevation no diagnostic STEMI evidence found  Preliminary view of Radiology shows mild cardiomegaly but no obvious pneumonia or infiltrates or pneumothorax.  Radiological Study: Ordered and Reviewed  Medical Tests: Ordered and Reviewed  ED Management:  Complexity of  problems addressed  Co-morbidities and/or factors adding to the complexity or risk for the patient:  Obesity, anticoagulated, diabetes mellitus, hypertension, history of tachy dysrhythmias.  Hypercholesterolemia.  History of a prior myocardial infarction  Problems addressed:  The pleuritic chest pain progressive since yesterday afternoon.  Acute problem/illness or progression/exacerbation of chronic problem with potential threat to life/bodily dysfunction?:  Chest pain in lady who has a prior history of myocardial infarction  Differential diagnoses/problems considered: see above     Complexity of data reviewed  Independent historian (EMS, parent/caregiver, family/friend):  Patient is the primary historian  Decision to obtain previous or outside records?: yes  Chart Review (nursing home, outside records, CareEverywhere): see above  Review of Rx medications: home medications reviewed  I have reviewed the current home medications including Coumadin  Labs/imaging/other tests obtained/considered (risk/benefits of testing discussed):  Patient is allergic to iodine patient is anticoagulated adequately.  She has a negative D-dimer I think that has successfully put the pulmonary embolus question a very low probability.  Cardiac enzymes are negative chest x-ray is unremarkable  My EKG Interpretation: see above  Labs/tests intepretation:  Thus far lab has been quite benign with a negative D-dimer negative troponin.  Good CBC adequate anticoagulation on the INR  My independent imaging interpretation:  No acute cardiopulmonary disease on chestX-ray I concur with the radiologist determination  Point of care US done/interpretation:  None none  Discussion with consults/radiologist/social work:  None    Risk of patient management  Treatment/interventions, IV medications, new prescriptions given:  I realize the patient is on Coumadin but very small dose of Toradol is given for this pain  High risk management (IV controlled  substances, admission, plans for OR, DNR, meds requiring monitoring, transfer, etc)?:  Did not discuss DNR  Workup/treatment affected by social determinants of health?:  None none   Advanced care planning/DNR/end of life discussion:  None      Shared decision making:  Discussed all of I results and plan with patient's son in room   We discussed the Norco at home she assured me it is not an allergy 10 mg this makes her too woozy in the head and she only to take 5 so prescription for 5 mg is written with patient's understanding           ED Course as of 01/11/23 1024   Wed Jan 11, 2023   1023 Discussed the negative workup with the patient why did not think this was a pulmonary embolus patient voiced her understanding.  We then discharge her home to continue your current medications we will give her Norco to take at home she said she just can take the 10 mg Norco can makes her too woozy she does findings with a 5 periods we will prescribe 5 mg Norco and discharged home.  She said it is not an allergy it just makes her too lightheaded to take 10 mg [DM]      ED Course User Index  [DM] Reed Sutton MD                 Clinical Impression:   Final diagnoses:  [R07.9] Chest pain  [R07.89] Chest wall pain  [R07.81] Pleurodynia (Primary)        ED Disposition Condition    Discharge Stable          ED Prescriptions       Medication Sig Dispense Start Date End Date Auth. Provider    HYDROcodone-acetaminophen (NORCO) 5-325 mg per tablet Take 1 tablet by mouth every 6 (six) hours as needed for Pain. 16 tablet 1/11/2023 -- Reed Sutton MD          Follow-up Information       Follow up With Specialties Details Why Contact Info    Dariana Iglesias, [facility] In 2 days  [address] [facility] [place] [address] [phone]               Reed Sutton MD  01/11/23 1024

## 2023-03-27 DIAGNOSIS — R60.0 LOCALIZED EDEMA: Primary | ICD-10-CM

## 2023-04-03 ENCOUNTER — HOSPITAL ENCOUNTER (OUTPATIENT)
Dept: RADIOLOGY | Facility: HOSPITAL | Age: 68
Discharge: HOME | End: 2023-04-03
Attending: NURSE PRACTITIONER
Payer: MEDICARE

## 2023-04-03 DIAGNOSIS — R60.0 LOCALIZED EDEMA: ICD-10-CM

## 2023-04-03 PROCEDURE — 93971 EXTREMITY STUDY: CPT | Mod: TC,RT

## 2023-04-13 ENCOUNTER — HOSPITAL ENCOUNTER (OUTPATIENT)
Dept: RADIOLOGY | Facility: HOSPITAL | Age: 68
Discharge: HOME | End: 2023-04-13
Attending: NURSE PRACTITIONER
Payer: MEDICARE

## 2023-04-13 DIAGNOSIS — Z12.31 ENCOUNTER FOR SCREENING MAMMOGRAM FOR BREAST CANCER: ICD-10-CM

## 2023-04-13 DIAGNOSIS — M81.0 OP (OSTEOPOROSIS): ICD-10-CM

## 2023-04-13 PROCEDURE — 77063 MAMMO DIGITAL SCREENING BILAT WITH TOMO: ICD-10-PCS | Mod: 26,,, | Performed by: STUDENT IN AN ORGANIZED HEALTH CARE EDUCATION/TRAINING PROGRAM

## 2023-04-13 PROCEDURE — 77067 MAMMO DIGITAL SCREENING BILAT WITH TOMO: ICD-10-PCS | Mod: 26,,, | Performed by: STUDENT IN AN ORGANIZED HEALTH CARE EDUCATION/TRAINING PROGRAM

## 2023-04-13 PROCEDURE — 77067 SCR MAMMO BI INCL CAD: CPT | Mod: 26,,, | Performed by: STUDENT IN AN ORGANIZED HEALTH CARE EDUCATION/TRAINING PROGRAM

## 2023-04-13 PROCEDURE — 77067 SCR MAMMO BI INCL CAD: CPT | Mod: TC

## 2023-04-13 PROCEDURE — 77080 DXA BONE DENSITY AXIAL: CPT | Mod: TC

## 2023-04-13 PROCEDURE — 77063 BREAST TOMOSYNTHESIS BI: CPT | Mod: 26,,, | Performed by: STUDENT IN AN ORGANIZED HEALTH CARE EDUCATION/TRAINING PROGRAM

## 2023-08-04 DIAGNOSIS — R17 UNSPECIFIED JAUNDICE: Primary | ICD-10-CM

## 2023-08-11 ENCOUNTER — HOSPITAL ENCOUNTER (OUTPATIENT)
Dept: RADIOLOGY | Facility: HOSPITAL | Age: 68
Discharge: HOME | End: 2023-08-11
Attending: NURSE PRACTITIONER
Payer: MEDICARE

## 2023-08-11 DIAGNOSIS — R17 UNSPECIFIED JAUNDICE: ICD-10-CM

## 2023-08-11 PROCEDURE — 76705 ECHO EXAM OF ABDOMEN: CPT | Mod: TC

## 2023-08-30 ENCOUNTER — HOSPITAL ENCOUNTER (EMERGENCY)
Facility: HOSPITAL | Age: 68
Discharge: HOME | End: 2023-08-30
Attending: INTERNAL MEDICINE
Payer: MEDICARE

## 2023-08-30 VITALS
DIASTOLIC BLOOD PRESSURE: 68 MMHG | RESPIRATION RATE: 18 BRPM | BODY MASS INDEX: 45.99 KG/M2 | HEIGHT: 67 IN | WEIGHT: 293 LBS | TEMPERATURE: 98 F | HEART RATE: 62 BPM | SYSTOLIC BLOOD PRESSURE: 142 MMHG | OXYGEN SATURATION: 99 %

## 2023-08-30 DIAGNOSIS — S76.011A STRAIN OF RIGHT HIP, INITIAL ENCOUNTER: ICD-10-CM

## 2023-08-30 DIAGNOSIS — S39.012A STRAIN OF FASCIA OF LOWER BACK: ICD-10-CM
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
## 2023-08-30 PROCEDURE — 63600175 PHARM REV CODE 636 W HCPCS: Performed by: NURSE PRACTITIONER

## 2023-08-30 PROCEDURE — 25000003 PHARM REV CODE 250: Performed by: NURSE PRACTITIONER

## 2023-08-30 PROCEDURE — 96372 THER/PROPH/DIAG INJ SC/IM: CPT | Performed by: NURSE PRACTITIONER

## 2023-08-30 PROCEDURE — 99284 EMERGENCY DEPT VISIT MOD MDM: CPT

## 2023-08-30 RX ORDER — METHOCARBAMOL 750 MG/1
1500 TABLET, FILM COATED ORAL 3 TIMES DAILY
Qty: 30 TABLET | Refills: 0 | Status: SHIPPED | OUTPATIENT
Start: 2023-08-30 | End: 2023-09-04

## 2023-08-30 RX ORDER — METHOCARBAMOL 750 MG/1
1500 TABLET, FILM COATED ORAL
Status: COMPLETED | OUTPATIENT
Start: 2023-08-30 | End: 2023-08-30

## 2023-08-30 RX ORDER — KETOROLAC TROMETHAMINE 30 MG/ML
30 INJECTION, SOLUTION INTRAMUSCULAR; INTRAVENOUS
Status: COMPLETED | OUTPATIENT
Start: 2023-08-30 | End: 2023-08-30

## 2023-08-30 RX ADMIN — KETOROLAC TROMETHAMINE 30 MG: 30 INJECTION, SOLUTION INTRAMUSCULAR at 07:08

## 2023-08-30 RX ADMIN — METHOCARBAMOL 1500 MG: 750 TABLET ORAL at 07:08

## 2023-08-31 NOTE — ED PROVIDER NOTES
Encounter Date: 8/30/2023       History     Chief Complaint   Patient presents with    Fall     Pt fell 2 days ago and now having rt hip, rt lower back and rt knee pain and difficulty walking.     Patient is a 68-year-old female presents emergency department with complaints of right lower back pain right hip pain and right upper leg pain that goes into the right knee.  She states this occurred 2 days ago when she lost her footing and states her right leg sort of got stuck underneath her and she was able to catch herself and did not fall but states she feels like she just strained her back and legs severely.  She again denies any trauma or fall that resulted in her hitting the ground with her hip or back.  She states the pain is been intermittently constant worse with movement.  She denies any alleviating factors.  She did see her PCP called her in some medication for this but she is been unable to pick it up at this time.  She denies any other complaints or associated symptoms.      Review of patient's allergies indicates:   Allergen Reactions    Iodine and iodide containing products Shortness Of Breath and Rash    Lortab [hydrocodone-acetaminophen] Other (See Comments)     Severe headaches    Sulfa (sulfonamide antibiotics) Nausea Only     Past Medical History:   Diagnosis Date    Allergy     Arthritis     Degenerative disc disease     2 bulging disc    Diabetes mellitus     Heart murmur     Hyperlipidemia     Hypertension     Obesity     Tachycardia     on Rhytmol     Past Surgical History:   Procedure Laterality Date    ABLATION OF DYSRHYTHMIC FOCUS      APPENDECTOMY      CHOLECYSTECTOMY       Family History   Problem Relation Age of Onset    Cancer Mother     Diabetes Mellitus Mother     Rheum arthritis Mother     Heart disease Father     Diabetes Mellitus Sister     Rheum arthritis Sister     Hypertension Brother     Diabetes Mellitus Sister     Rheum arthritis Sister     Rheum arthritis Sister     Hypertension  Brother      Social History     Tobacco Use    Smoking status: Never   Substance Use Topics    Alcohol use: No    Drug use: No     Review of Systems   Constitutional:  Negative for activity change, appetite change and fever.   HENT:  Negative for congestion, dental problem and sore throat.    Eyes:  Negative for discharge and itching.   Respiratory:  Negative for apnea, chest tightness and shortness of breath.    Cardiovascular:  Negative for chest pain.   Gastrointestinal:  Negative for abdominal distention, abdominal pain and nausea.   Endocrine: Negative for cold intolerance and heat intolerance.   Genitourinary:  Negative for difficulty urinating, dysuria, vaginal bleeding, vaginal discharge and vaginal pain.   Musculoskeletal:  Positive for arthralgias, back pain and myalgias.   Skin:  Negative for rash.   Neurological:  Negative for dizziness, facial asymmetry and weakness.   Hematological:  Does not bruise/bleed easily.   Psychiatric/Behavioral:  Negative for agitation and behavioral problems.    All other systems reviewed and are negative.      Physical Exam     Initial Vitals [08/30/23 1946]   BP Pulse Resp Temp SpO2   (!) 140/60 60 18 98.3 °F (36.8 °C) 98 %      MAP       --         Physical Exam    Nursing note and vitals reviewed.  Constitutional: Vital signs are normal. She appears well-developed and well-nourished.  Non-toxic appearance. She does not have a sickly appearance.   HENT:   Head: Normocephalic and atraumatic.   Right Ear: External ear normal.   Left Ear: External ear normal.   Eyes: Conjunctivae, EOM and lids are normal. Pupils are equal, round, and reactive to light. Lids are everted and swept, no foreign bodies found.   Neck: Trachea normal and phonation normal. Neck supple. No thyroid mass and no thyromegaly present.   Normal range of motion.   Full passive range of motion without pain.     Cardiovascular:  Normal rate, regular rhythm, S1 normal, S2 normal, normal heart sounds, intact  distal pulses and normal pulses.           Pulmonary/Chest: Breath sounds normal.   Abdominal: Abdomen is soft. There is no abdominal tenderness.   Musculoskeletal:         General: Tenderness present. No edema.      Cervical back: Full passive range of motion without pain, normal range of motion and neck supple.      Comments: Very limited range of motion with the right leg with any movement causing her to have a moderate amount of pain in the right lower back right hip.  No obvious signs of trauma to the back including any bruising swelling or deformity.     Lymphadenopathy:     She has no cervical adenopathy.   Neurological: She is alert and oriented to person, place, and time. She has normal strength. GCS score is 15. GCS eye subscore is 4. GCS verbal subscore is 5. GCS motor subscore is 6.   Skin: Skin is warm, dry and intact. Capillary refill takes less than 2 seconds.   Psychiatric: She has a normal mood and affect. Her speech is normal and behavior is normal. Judgment normal. Cognition and memory are normal.         ED Course   Procedures  Labs Reviewed - No data to display       Imaging Results              X-Ray Knee 1 or 2 View Right (In process)                      X-Ray Hip 2 or 3 views Right (with Pelvis when performed) (In process)                      X-Ray Femur 2 AP/LAT Right (In process)                      Medications   ketorolac injection 30 mg (30 mg Intramuscular Given 8/30/23 1958)   methocarbamoL tablet 1,500 mg (1,500 mg Oral Given 8/30/23 1958)     Medical Decision Making  Patient is a 68-year-old female presents emerged department complaints of right hip right leg right knee pain.  Patient states this occurred when she was about to fall 2 days ago but was able to catch herself causing her right leg to get stuck underneath in front of her causing a strain she describes.  She denies any other complaints associated symptoms.    Problems Addressed:  Fall: acute illness or injury  Strain of  fascia of lower back: acute illness or injury     Details: X-ray was done of the right hip and there is no acute findings.  Discussed muscle relaxers with did give relief here as well as Toradol.  She does have pain medicine called her pharmacy so we will add Robaxin muscle relaxer we discussed heat.  Strict ED return precautions discussed me change or worsening symptoms.  Strain of right hip, initial encounter: acute illness or injury    Amount and/or Complexity of Data Reviewed  External Data Reviewed: labs, radiology and notes.  Radiology: ordered and independent interpretation performed.    Risk  Prescription drug management.                               Clinical Impression:   Final diagnoses:  [W19.XXXA] Fall (Primary)  [S39.012A] Strain of fascia of lower back  [S76.011A] Strain of right hip, initial encounter        ED Disposition Condition    Discharge Stable          ED Prescriptions       Medication Sig Dispense Start Date End Date Auth. Provider    methocarbamoL (ROBAXIN) 750 MG Tab Take 2 tablets (1,500 mg total) by mouth 3 (three) times daily. for 5 days 30 tablet 8/30/2023 9/4/2023 Saeid Rivers FNP          Follow-up Information       Follow up With Specialties Details Why Contact Info    Dariana Iglesias FNP Family Medicine, Emergency Medicine Schedule an appointment as soon as possible for a visit  As needed, For ER Follow Up. [address] [facility] [place], [place] [address]  [phone]               Saeid Rivers FNP  08/30/23 0992

## 2023-12-29 ENCOUNTER — HOSPITAL ENCOUNTER (EMERGENCY)
Facility: HOSPITAL | Age: 68
Discharge: HOME | End: 2023-12-29
Attending: STUDENT IN AN ORGANIZED HEALTH CARE EDUCATION/TRAINING PROGRAM
Payer: MEDICARE

## 2023-12-29 VITALS
HEIGHT: 67 IN | DIASTOLIC BLOOD PRESSURE: 75 MMHG | TEMPERATURE: 97 F | OXYGEN SATURATION: 97 % | BODY MASS INDEX: 45.99 KG/M2 | RESPIRATION RATE: 16 BRPM | SYSTOLIC BLOOD PRESSURE: 152 MMHG | WEIGHT: 293 LBS | HEART RATE: 62 BPM

## 2023-12-29 DIAGNOSIS — R10.30 LOWER ABDOMINAL PAIN: Primary | ICD-10-CM

## 2023-12-29 DIAGNOSIS — R11.2 NAUSEA & VOMITING: ICD-10-CM

## 2023-12-29 LAB
ALBUMIN SERPL-MCNC: 3.7 G/DL (ref 3.4–4.8)
ALBUMIN/GLOB SERPL: 0.9 RATIO (ref 1.1–2)
ALP SERPL-CCNC: 112 UNIT/L (ref 40–150)
ALT SERPL-CCNC: 16 UNIT/L (ref 0–55)
APPEARANCE UR: CLEAR
APTT PPP: 51.5 SECONDS (ref 24.6–37.2)
AST SERPL-CCNC: 19 UNIT/L (ref 5–34)
BACTERIA #/AREA URNS AUTO: NORMAL /HPF
BASOPHILS # BLD AUTO: 0.03 X10(3)/MCL
BASOPHILS NFR BLD AUTO: 0.5 %
BILIRUB SERPL-MCNC: 2.3 MG/DL
BILIRUB UR QL STRIP.AUTO: NEGATIVE
BUN SERPL-MCNC: 12 MG/DL (ref 9.8–20.1)
CALCIUM SERPL-MCNC: 10.3 MG/DL (ref 8.4–10.2)
CHLORIDE SERPL-SCNC: 104 MMOL/L (ref 98–107)
CO2 SERPL-SCNC: 27 MMOL/L (ref 23–31)
COLOR UR AUTO: YELLOW
CREAT SERPL-MCNC: 0.95 MG/DL (ref 0.55–1.02)
EOSINOPHIL # BLD AUTO: 0.04 X10(3)/MCL (ref 0–0.9)
EOSINOPHIL NFR BLD AUTO: 0.6 %
ERYTHROCYTE [DISTWIDTH] IN BLOOD BY AUTOMATED COUNT: 12.4 % (ref 11.5–17)
GFR SERPLBLD CREATININE-BSD FMLA CKD-EPI: >60 MLS/MIN/1.73/M2
GLOBULIN SER-MCNC: 4 GM/DL (ref 2.4–3.5)
GLUCOSE SERPL-MCNC: 103 MG/DL (ref 82–115)
GLUCOSE UR QL STRIP.AUTO: NEGATIVE
HCT VFR BLD AUTO: 45 % (ref 37–47)
HGB BLD-MCNC: 14.9 G/DL (ref 12–16)
IMM GRANULOCYTES # BLD AUTO: 0.01 X10(3)/MCL (ref 0–0.04)
IMM GRANULOCYTES NFR BLD AUTO: 0.2 %
INR PPP: 2.6
KETONES UR QL STRIP.AUTO: NEGATIVE
LEUKOCYTE ESTERASE UR QL STRIP.AUTO: ABNORMAL
LIPASE SERPL-CCNC: 21 U/L
LYMPHOCYTES # BLD AUTO: 0.82 X10(3)/MCL (ref 0.6–4.6)
LYMPHOCYTES NFR BLD AUTO: 12.5 %
MAGNESIUM SERPL-MCNC: 2.1 MG/DL (ref 1.6–2.6)
MCH RBC QN AUTO: 31.6 PG (ref 27–31)
MCHC RBC AUTO-ENTMCNC: 33.1 G/DL (ref 33–36)
MCV RBC AUTO: 95.5 FL (ref 80–94)
MONOCYTES # BLD AUTO: 0.34 X10(3)/MCL (ref 0.1–1.3)
MONOCYTES NFR BLD AUTO: 5.2 %
NEUTROPHILS # BLD AUTO: 5.32 X10(3)/MCL (ref 2.1–9.2)
NEUTROPHILS NFR BLD AUTO: 81 %
NITRITE UR QL STRIP.AUTO: NEGATIVE
PH UR STRIP.AUTO: 6.5 [PH]
PLATELET # BLD AUTO: 191 X10(3)/MCL (ref 130–400)
PMV BLD AUTO: 8.9 FL (ref 7.4–10.4)
POTASSIUM SERPL-SCNC: 4 MMOL/L (ref 3.5–5.1)
PROT SERPL-MCNC: 7.7 GM/DL (ref 5.8–7.6)
PROT UR QL STRIP.AUTO: NEGATIVE
PROTHROMBIN TIME: 28.4 SECONDS (ref 12.5–14.5)
RBC # BLD AUTO: 4.71 X10(6)/MCL (ref 4.2–5.4)
RBC #/AREA URNS AUTO: NORMAL /HPF
RBC UR QL AUTO: ABNORMAL
SODIUM SERPL-SCNC: 138 MMOL/L (ref 136–145)
SP GR UR STRIP.AUTO: 1.01 (ref 1–1.03)
SQUAMOUS #/AREA URNS AUTO: NORMAL /HPF
TROPONIN I SERPL-MCNC: <0.01 NG/ML (ref 0–0.04)
UROBILINOGEN UR STRIP-ACNC: 0.2
WBC # SPEC AUTO: 6.56 X10(3)/MCL (ref 4.5–11.5)
WBC #/AREA URNS AUTO: NORMAL /HPF

## 2023-12-29 PROCEDURE — 85730 THROMBOPLASTIN TIME PARTIAL: CPT | Performed by: STUDENT IN AN ORGANIZED HEALTH CARE EDUCATION/TRAINING PROGRAM

## 2023-12-29 PROCEDURE — 83735 ASSAY OF MAGNESIUM: CPT | Performed by: STUDENT IN AN ORGANIZED HEALTH CARE EDUCATION/TRAINING PROGRAM

## 2023-12-29 PROCEDURE — 85025 COMPLETE CBC W/AUTO DIFF WBC: CPT | Performed by: STUDENT IN AN ORGANIZED HEALTH CARE EDUCATION/TRAINING PROGRAM

## 2023-12-29 PROCEDURE — 85610 PROTHROMBIN TIME: CPT | Performed by: STUDENT IN AN ORGANIZED HEALTH CARE EDUCATION/TRAINING PROGRAM

## 2023-12-29 PROCEDURE — 99285 EMERGENCY DEPT VISIT HI MDM: CPT | Mod: 25

## 2023-12-29 PROCEDURE — 80053 COMPREHEN METABOLIC PANEL: CPT | Performed by: STUDENT IN AN ORGANIZED HEALTH CARE EDUCATION/TRAINING PROGRAM

## 2023-12-29 PROCEDURE — 83690 ASSAY OF LIPASE: CPT | Performed by: STUDENT IN AN ORGANIZED HEALTH CARE EDUCATION/TRAINING PROGRAM

## 2023-12-29 PROCEDURE — 81003 URINALYSIS AUTO W/O SCOPE: CPT | Performed by: STUDENT IN AN ORGANIZED HEALTH CARE EDUCATION/TRAINING PROGRAM

## 2023-12-29 PROCEDURE — 84484 ASSAY OF TROPONIN QUANT: CPT | Performed by: STUDENT IN AN ORGANIZED HEALTH CARE EDUCATION/TRAINING PROGRAM

## 2023-12-29 NOTE — ED PROVIDER NOTES
Encounter Date: 12/29/2023       History     Chief Complaint   Patient presents with    Abdominal Pain    Nausea     C/o lower abd pain with nausea started this am     HPI     68-year-old female with a past medical history of Afib, right leg DVT on Coumadin, hypertension, hyperlipidemia, super morbid obesity, type 2 diabetes presents emergency department for lower abdominal pain started abruptly a few hours ago.  Patient states the pain has not stopped.  States it feels like a muscle spasm she had in the past but much worse.  Patient states the pain is making her be nauseated with no vomiting.  No constipation or diarrhea.  No burning on urination    Review of patient's allergies indicates:   Allergen Reactions    Iodine and iodide containing products Shortness Of Breath and Rash    Lortab [hydrocodone-acetaminophen] Other (See Comments)     Severe headaches    Sulfa (sulfonamide antibiotics) Nausea Only     Past Medical History:   Diagnosis Date    Allergy     Arthritis     Degenerative disc disease     2 bulging disc    Diabetes mellitus     Heart murmur     Hyperlipidemia     Hypertension     Obesity     Tachycardia     on Rhytmol     Past Surgical History:   Procedure Laterality Date    ABLATION OF DYSRHYTHMIC FOCUS      APPENDECTOMY      CHOLECYSTECTOMY       Family History   Problem Relation Age of Onset    Cancer Mother     Diabetes Mellitus Mother     Rheum arthritis Mother     Heart disease Father     Diabetes Mellitus Sister     Rheum arthritis Sister     Hypertension Brother     Diabetes Mellitus Sister     Rheum arthritis Sister     Rheum arthritis Sister     Hypertension Brother      Social History     Tobacco Use    Smoking status: Never   Substance Use Topics    Alcohol use: No    Drug use: No     Review of Systems   Constitutional:  Negative for fever.   HENT:  Negative for sore throat.    Respiratory:  Negative for cough and shortness of breath.    Cardiovascular:  Negative for chest pain.    Gastrointestinal:  Positive for abdominal pain and nausea. Negative for constipation, diarrhea and vomiting.   Genitourinary:  Negative for dysuria.   Musculoskeletal:  Negative for back pain.   Skin:  Negative for rash.   Neurological:  Negative for weakness and headaches.   Hematological:  Bruises/bleeds easily.   All other systems reviewed and are negative.      Physical Exam     Initial Vitals [12/29/23 1117]   BP Pulse Resp Temp SpO2   131/65 61 18 97.6 °F (36.4 °C) 96 %      MAP       --         Physical Exam    Nursing note and vitals reviewed.  Constitutional: She appears well-developed and well-nourished. She is Obese . No distress.   Cardiovascular:  Normal rate. An irregularly irregular rhythm present.           Pulmonary/Chest: Breath sounds normal. No respiratory distress. She has no wheezes. She has no rhonchi. She has no rales.   Abdominal: Abdomen is soft. There is abdominal tenderness (generalized lower abdominal pain).    Large pannus There is no rebound and no guarding.   Musculoskeletal:         General: Normal range of motion.     Neurological: She is alert and oriented to person, place, and time. She has normal strength. GCS score is 15. GCS eye subscore is 4. GCS verbal subscore is 5. GCS motor subscore is 6.   Skin: Skin is warm. Capillary refill takes less than 2 seconds.         ED Course   Procedures  Labs Reviewed   COMPREHENSIVE METABOLIC PANEL - Abnormal; Notable for the following components:       Result Value    Calcium Level Total 10.3 (*)     Protein Total 7.7 (*)     Globulin 4.0 (*)     Albumin/Globulin Ratio 0.9 (*)     Bilirubin Total 2.3 (*)     All other components within normal limits   URINALYSIS, REFLEX TO URINE CULTURE - Abnormal; Notable for the following components:    Blood, UA Trace-Lysed (*)     Leukocyte Esterase, UA Trace (*)     All other components within normal limits   CBC WITH DIFFERENTIAL - Abnormal; Notable for the following components:    MCV 95.5 (*)      MCH 31.6 (*)     All other components within normal limits   APTT - Abnormal; Notable for the following components:    PTT 51.5 (*)     All other components within normal limits   PROTIME-INR - Abnormal; Notable for the following components:    PT 28.4 (*)     INR 2.6 (*)     All other components within normal limits   TROPONIN I - Normal   MAGNESIUM - Normal   LIPASE - Normal   URINALYSIS, MICROSCOPIC - Normal   CBC W/ AUTO DIFFERENTIAL    Narrative:     The following orders were created for panel order CBC auto differential.  Procedure                               Abnormality         Status                     ---------                               -----------         ------                     CBC with Differential[[id number]]        Abnormal            Final result                 Please view results for these tests on the individual orders.          Imaging Results              US Pelvis Comp with Transvag NON-OB (xpd (Final result)  Result time 12/29/23 15:11:19      Final result by Reed Hinojosa MD (12/29/23 15:11:19)                   Impression:      1. Of visualization of the left ovary  2. Otherwise unremarkable pelvic sonogram      Electronically signed by: Reed Hinojosa  Date:    12/29/2023  Time:    15:11               Narrative:    EXAMINATION:  ULTRASOUND PELVIS NON OB COMPLETE:    CLINICAL HISTORY:  , pelvic pain;    COMPARISON:  None available    FINDINGS:  Transabdominal  scanning was performed. Multiple sonographic images reveal the uterus to be normal in size measuring 8.4 x 2.6 x 5.0 cm.  The right ovary measures 2.6 x 1.5 x 2.3 cm and demonstrates venous flow.  The left ovary is not identified.   The endometrium is not thickened. No free fluid is seen within the cul-de-sac. No abnormal adnexal mass is seen.                                       CT Abdomen Pelvis  Without Contrast (Final result)  Result time 12/29/23 12:57:56      Final result by Reed Hinojosa MD (12/29/23 12:57:56)                    Impression:      1. Cardiomegaly  2. Developing infiltrate and or atelectasis at the posterior lower lungs  3. Status post cholecystectomy  4. Atherosclerosis  5. Thoracolumbar spondylosis  6. Mild stranding/edema in the lower pelvis  7. Findings of mild constipation      Electronically signed by: Reed Hinojosa  Date:    12/29/2023  Time:    12:57               Narrative:    EXAMINATION:  CT ABDOMEN PELVIS WITHOUT CONTRAST    CLINICAL HISTORY:  Abdominal pain, acute, nonlocalized;, .    TECHNIQUE:  PATIENT RADIATION DOSE: DLP(mGycm) 1173    As per PQRS measures, all CT scans at this facility used dose modulation, iterative reconstruction, and/or weight based dose adjustment when appropriate to reduce radiation dose to as low as reasonably achievable.    COMPARISON:  12/16/2019    FINDINGS:  Serial axial images were obtained of the abdomen pelvis without the administration of IV contrast.  Additional sagittal and coronal reconstructions were performed. Degenerative changes are noted to the thoracolumbar spine.  The heart is enlarged.  Atelectasis and or developing infiltrate is evident at the lung bases.  A 3 mm calcified granulomas evident the right lung base.  The liver is mildly enlarged.  The spleen, adrenal glands, and pancreas are grossly within normal limits without the administration of IV contrast.  The gallbladder is surgically absent.  The kidneys are relatively symmetric in size.  No hydronephrosis is seen.  Atherosclerosis is seen within the aorta and branching vessels.  A few small lymph nodes seen within the periaortic or pericaval region.  No dilated loops of bowel are identified.  Feces is evident throughout the joint the colon.  The appendix is not identified with certainty.  The uterus is normal in size.  The bladder is nondistended.  No significant free fluid collection identified.  There is mild edema/stranding in the lower pelvis.                                       Medications  - No data to display  Medical Decision Making  Problems Addressed:  Lower abdominal pain: undiagnosed new problem with uncertain prognosis    Amount and/or Complexity of Data Reviewed  Labs: ordered. Decision-making details documented in ED Course.  Radiology: ordered.  ECG/medicine tests: ordered and independent interpretation performed.    Risk  OTC drugs.               ED Course as of 12/29/23 1626   Fri Dec 29, 2023   1623  Patient feels better.  Her  CT showed nonspecific fluid and stranding in the lower abdomen.  I did  order an ultrasound which was normal.  Patient feels better.  No complaints currently.  Will discharge.  I informed patient that she is to return if anything worsens.  She agrees. showed [BS]   1626 Leukocytes, UA(!): Trace [BS]   1626 NITRITE UA: Negative [BS]   1626 Sodium: 138 [BS]   1626 Potassium: 4.0 [BS]   1626 Chloride: 104 [BS]   1626 CO2: 27 [BS]   1626 Glucose: 103 [BS]   1626 BUN: 12.0 [BS]   1626 Creatinine: 0.95 [BS]   1626 Bacteria, UA: Rare [BS]   1626 Troponin I: <0.010 [BS]   1626 Magnesium : 2.10 [BS]   1626 WBC: 6.56 [BS]   1626 Hematocrit: 45.0 [BS]   1626 Platelet Count: 191 [BS]      ED Course User Index  [BS] Hubert Jimenez MD                           Clinical Impression:  Final diagnoses:  [R11.2] Nausea & vomiting  [R10.30] Lower abdominal pain (Primary)          ED Disposition Condition    Discharge Stable          ED Prescriptions    None       Follow-up Information       Follow up With Specialties Details Why Contact Info    Dariana Iglesias FNP Family Medicine, Emergency Medicine Schedule an appointment as soon as possible for a visit   [address] [place] [id number]  [phone]      [facility] - Emergency Dept Emergency Medicine Go to  If symptoms worsen [address] [place] [id number] [phone]             Hubert Jimenez MD  12/29/23 1629

## 2024-02-07 DIAGNOSIS — R10.9 STOMACH ACHE: Primary | ICD-10-CM

## 2024-02-07 DIAGNOSIS — M25.562 LEFT KNEE PAIN: ICD-10-CM

## 2024-02-07 DIAGNOSIS — Z12.31 ENCOUNTER FOR SCREENING MAMMOGRAM FOR MALIGNANT NEOPLASM OF BREAST: Primary | ICD-10-CM

## 2024-02-08 DIAGNOSIS — D25.9 LEIOMYOMA OF UTERUS, UNSPECIFIED: Primary | ICD-10-CM

## 2024-02-16 ENCOUNTER — HOSPITAL ENCOUNTER (OUTPATIENT)
Dept: RADIOLOGY | Facility: HOSPITAL | Age: 69
Discharge: HOME | End: 2024-02-16
Attending: NURSE PRACTITIONER
Payer: MEDICARE

## 2024-02-16 DIAGNOSIS — R10.9 STOMACH ACHE: ICD-10-CM

## 2024-02-16 DIAGNOSIS — M25.562 LEFT KNEE PAIN: ICD-10-CM

## 2024-02-16 PROCEDURE — 74176 CT ABD & PELVIS W/O CONTRAST: CPT | Mod: TC

## 2024-02-16 PROCEDURE — 73700 CT LOWER EXTREMITY W/O DYE: CPT | Mod: TC,LT

## 2024-04-25 ENCOUNTER — HOSPITAL ENCOUNTER (OUTPATIENT)
Dept: RADIOLOGY | Facility: HOSPITAL | Age: 69
Discharge: HOME | End: 2024-04-25
Attending: NURSE PRACTITIONER
Payer: MEDICARE

## 2024-04-25 DIAGNOSIS — Z12.31 ENCOUNTER FOR SCREENING MAMMOGRAM FOR MALIGNANT NEOPLASM OF BREAST: ICD-10-CM

## 2024-04-25 PROCEDURE — 77063 BREAST TOMOSYNTHESIS BI: CPT | Mod: 26,,, | Performed by: STUDENT IN AN ORGANIZED HEALTH CARE EDUCATION/TRAINING PROGRAM

## 2024-04-25 PROCEDURE — 77063 BREAST TOMOSYNTHESIS BI: CPT | Mod: TC

## 2024-04-25 PROCEDURE — 77067 SCR MAMMO BI INCL CAD: CPT | Mod: 26,,, | Performed by: STUDENT IN AN ORGANIZED HEALTH CARE EDUCATION/TRAINING PROGRAM

## 2025-01-29 DIAGNOSIS — R10.9 STOMACH ACHE: Primary | ICD-10-CM

## 2025-02-07 ENCOUNTER — HOSPITAL ENCOUNTER (OUTPATIENT)
Dept: RADIOLOGY | Facility: HOSPITAL | Age: 70
Discharge: HOME | End: 2025-02-07
Attending: NURSE PRACTITIONER
Payer: MEDICARE

## 2025-02-07 DIAGNOSIS — R10.9 STOMACH ACHE: ICD-10-CM

## 2025-02-07 PROCEDURE — 74176 CT ABD & PELVIS W/O CONTRAST: CPT | Mod: TC

## 2025-02-10 DIAGNOSIS — M23.92 UNSPECIFIED INTERNAL DERANGEMENT OF LEFT KNEE: Primary | ICD-10-CM

## 2025-03-17 ENCOUNTER — HOSPITAL ENCOUNTER (EMERGENCY)
Facility: HOSPITAL | Age: 70
Discharge: HOME | End: 2025-03-17
Attending: INTERNAL MEDICINE
Payer: MEDICARE

## 2025-03-17 VITALS
HEART RATE: 61 BPM | RESPIRATION RATE: 17 BRPM | WEIGHT: 289 LBS | DIASTOLIC BLOOD PRESSURE: 61 MMHG | HEIGHT: 67 IN | BODY MASS INDEX: 45.36 KG/M2 | OXYGEN SATURATION: 97 % | SYSTOLIC BLOOD PRESSURE: 153 MMHG | TEMPERATURE: 98 F

## 2025-03-17 DIAGNOSIS — I48.0 PAROXYSMAL A-FIB: Primary | ICD-10-CM

## 2025-03-17 DIAGNOSIS — R00.2 PALPITATIONS: ICD-10-CM

## 2025-03-17 LAB
ALBUMIN SERPL-MCNC: 3.7 G/DL (ref 3.4–4.8)
ALBUMIN/GLOB SERPL: 1 RATIO (ref 1.1–2)
ALP SERPL-CCNC: 88 UNIT/L (ref 40–150)
ALT SERPL-CCNC: 13 UNIT/L (ref 0–55)
ANION GAP SERPL CALC-SCNC: 6 MEQ/L
AST SERPL-CCNC: 16 UNIT/L (ref 5–34)
BASOPHILS # BLD AUTO: 0.02 X10(3)/MCL
BASOPHILS NFR BLD AUTO: 0.6 %
BILIRUB SERPL-MCNC: 1.4 MG/DL
BNP BLD-MCNC: 98.9 PG/ML
BUN SERPL-MCNC: 15 MG/DL (ref 9.8–20.1)
CALCIUM SERPL-MCNC: 10.5 MG/DL (ref 8.4–10.2)
CHLORIDE SERPL-SCNC: 109 MMOL/L (ref 98–107)
CHOLEST SERPL-MCNC: 210 MG/DL
CHOLEST/HDLC SERPL: 5 {RATIO} (ref 0–5)
CO2 SERPL-SCNC: 24 MMOL/L (ref 23–31)
CREAT SERPL-MCNC: 0.91 MG/DL (ref 0.55–1.02)
CREAT/UREA NIT SERPL: 16
EOSINOPHIL # BLD AUTO: 0.06 X10(3)/MCL (ref 0–0.9)
EOSINOPHIL NFR BLD AUTO: 1.7 %
ERYTHROCYTE [DISTWIDTH] IN BLOOD BY AUTOMATED COUNT: 12.7 % (ref 11.5–17)
GFR SERPLBLD CREATININE-BSD FMLA CKD-EPI: >60 ML/MIN/1.73/M2
GLOBULIN SER-MCNC: 3.7 GM/DL (ref 2.4–3.5)
GLUCOSE SERPL-MCNC: 99 MG/DL (ref 82–115)
HCT VFR BLD AUTO: 42.2 % (ref 37–47)
HDLC SERPL-MCNC: 40 MG/DL (ref 35–60)
HGB BLD-MCNC: 14.6 G/DL (ref 12–16)
IMM GRANULOCYTES # BLD AUTO: 0.01 X10(3)/MCL (ref 0–0.04)
IMM GRANULOCYTES NFR BLD AUTO: 0.3 %
INR PPP: 2.1
LDLC SERPL CALC-MCNC: 148 MG/DL (ref 50–140)
LYMPHOCYTES # BLD AUTO: 0.92 X10(3)/MCL (ref 0.6–4.6)
LYMPHOCYTES NFR BLD AUTO: 26.6 %
MCH RBC QN AUTO: 32.8 PG (ref 27–31)
MCHC RBC AUTO-ENTMCNC: 34.6 G/DL (ref 33–36)
MCV RBC AUTO: 94.8 FL (ref 80–94)
MONOCYTES # BLD AUTO: 0.31 X10(3)/MCL (ref 0.1–1.3)
MONOCYTES NFR BLD AUTO: 9 %
NEUTROPHILS # BLD AUTO: 2.14 X10(3)/MCL (ref 2.1–9.2)
NEUTROPHILS NFR BLD AUTO: 61.8 %
NRBC BLD AUTO-RTO: 0 %
OHS QRS DURATION: 222 MS
OHS QTC CALCULATION: 501 MS
PLATELET # BLD AUTO: 168 X10(3)/MCL (ref 130–400)
PMV BLD AUTO: 8.9 FL (ref 7.4–10.4)
POTASSIUM SERPL-SCNC: 3.7 MMOL/L (ref 3.5–5.1)
PROT SERPL-MCNC: 7.4 GM/DL (ref 5.8–7.6)
PROTHROMBIN TIME: 24.9 SECONDS (ref 12.4–14.9)
RBC # BLD AUTO: 4.45 X10(6)/MCL (ref 4.2–5.4)
SODIUM SERPL-SCNC: 139 MMOL/L (ref 136–145)
TRIGL SERPL-MCNC: 112 MG/DL (ref 37–140)
TROPONIN I SERPL-MCNC: <0.01 NG/ML (ref 0–0.04)
VLDLC SERPL CALC-MCNC: 22 MG/DL
WBC # BLD AUTO: 3.46 X10(3)/MCL (ref 4.5–11.5)

## 2025-03-17 PROCEDURE — 83880 ASSAY OF NATRIURETIC PEPTIDE: CPT | Performed by: INTERNAL MEDICINE

## 2025-03-17 PROCEDURE — 85610 PROTHROMBIN TIME: CPT | Performed by: INTERNAL MEDICINE

## 2025-03-17 PROCEDURE — 80061 LIPID PANEL: CPT | Performed by: INTERNAL MEDICINE

## 2025-03-17 PROCEDURE — 84484 ASSAY OF TROPONIN QUANT: CPT | Performed by: INTERNAL MEDICINE

## 2025-03-17 PROCEDURE — 93010 ELECTROCARDIOGRAM REPORT: CPT | Mod: ,,, | Performed by: INTERNAL MEDICINE

## 2025-03-17 PROCEDURE — 80053 COMPREHEN METABOLIC PANEL: CPT | Performed by: INTERNAL MEDICINE

## 2025-03-17 PROCEDURE — 85025 COMPLETE CBC W/AUTO DIFF WBC: CPT | Performed by: INTERNAL MEDICINE

## 2025-03-17 PROCEDURE — 99285 EMERGENCY DEPT VISIT HI MDM: CPT | Mod: 25

## 2025-03-17 PROCEDURE — 25000003 PHARM REV CODE 250: Performed by: INTERNAL MEDICINE

## 2025-03-17 PROCEDURE — 93005 ELECTROCARDIOGRAM TRACING: CPT

## 2025-03-17 RX ORDER — POTASSIUM CHLORIDE 20 MEQ/1
40 TABLET, EXTENDED RELEASE ORAL
Status: COMPLETED | OUTPATIENT
Start: 2025-03-17 | End: 2025-03-17

## 2025-03-17 RX ADMIN — POTASSIUM CHLORIDE 40 MEQ: 1500 TABLET, EXTENDED RELEASE ORAL at 10:03

## 2025-03-17 NOTE — ED PROVIDER NOTES
Encounter Date: 3/17/2025  History from patient     History     Chief Complaint   Patient presents with    Palpitations     Brought per [facility] for c/o palpitations since 6 this am     TONYA Doe is 69 y.o. female who  has a past medical history of Allergy, Arthritis, Degenerative disc disease, Diabetes mellitus, Heart murmur, Hyperlipidemia, Hypertension, Obesity, Paroxysmal atrial fibrillation, and Tachycardia. arrives in ER with c/o Palpitations (Brought per [facility] for c/o palpitations since 6 this am)    Review of patient's allergies indicates:   Allergen Reactions    Iodine and iodide containing products Shortness Of Breath and Rash    Lortab [hydrocodone-acetaminophen] Other (See Comments)     Severe headaches    Sulfa (sulfonamide antibiotics) Nausea Only     Past Medical History:   Diagnosis Date    Allergy     Arthritis     Degenerative disc disease     2 bulging disc    Diabetes mellitus     Heart murmur     Hyperlipidemia     Hypertension     Obesity     Paroxysmal atrial fibrillation     Tachycardia     on Rhytmol     Past Surgical History:   Procedure Laterality Date    ABLATION OF DYSRHYTHMIC FOCUS      APPENDECTOMY      CHOLECYSTECTOMY       Family History   Problem Relation Name Age of Onset    Cancer Mother      Diabetes Mellitus Mother      Rheum arthritis Mother      Heart disease Father      Diabetes Mellitus Sister      Rheum arthritis Sister      Hypertension Brother      Diabetes Mellitus Sister      Rheum arthritis Sister      Rheum arthritis Sister      Hypertension Brother       Social History[1]  Review of Systems   Constitutional:  Negative for fever.   HENT:  Negative for trouble swallowing and voice change.    Eyes:  Negative for visual disturbance.   Respiratory:  Negative for cough and shortness of breath.    Cardiovascular:  Positive for palpitations. Negative for chest pain.        Resolved prior to arrival   Gastrointestinal:  Negative for abdominal pain, diarrhea and  vomiting.   Genitourinary:  Negative for dysuria and hematuria.   Musculoskeletal:  Negative for back pain and gait problem.   Skin:  Negative for color change and rash.   Neurological:  Negative for headaches.   Psychiatric/Behavioral:  Negative for behavioral problems and sleep disturbance.    All other systems reviewed and are negative.      Physical Exam     Initial Vitals [03/17/25 0840]   BP Pulse Resp Temp SpO2   (!) 143/66 60 16 97.1 °F (36.2 °C) 97 %      MAP       --         Physical Exam    Nursing note and vitals reviewed.  Constitutional: She appears well-developed and well-nourished.   HENT:   Head: Atraumatic.   Eyes: EOM are normal.   Neck: Neck supple.   Cardiovascular:  Normal rate and regular rhythm.           Paced rhythm on monitor with a heart rate of 60   Pulmonary/Chest: Breath sounds normal. No respiratory distress. She has no wheezes.   Abdominal: Abdomen is soft. Bowel sounds are normal. She exhibits no distension. There is no abdominal tenderness. There is no rebound.   Musculoskeletal:         General: No edema.      Cervical back: Neck supple.     Neurological: She is alert and oriented to person, place, and time. GCS score is 15. GCS eye subscore is 4. GCS verbal subscore is 5. GCS motor subscore is 6.   Tremor   Psychiatric: She has a normal mood and affect.         ED Course   Procedures  Orders Placed This Encounter    X-ray Chest AP Portable    Comprehensive metabolic panel    CBC auto differential    Troponin I    Brain natriuretic peptide    Lipid Panel    CBC with Differential    Protime-INR    Diet NPO    Vital signs    Cardiac Monitoring - Adult    Oxygen Continuous    Pulse Oximetry Continuous    Cardiac monitoring strips    Cardiac monitoring strips    EKG 12-LEAD    Insert saline lock    potassium chloride SA CR tablet 40 mEq       Labs Reviewed   COMPREHENSIVE METABOLIC PANEL - Abnormal       Result Value    Sodium 139      Potassium 3.7      Chloride 109 (*)     CO2 24       Glucose 99      Blood Urea Nitrogen 15.0      Creatinine 0.91      Calcium 10.5 (*)     Protein Total 7.4      Albumin 3.7      Globulin 3.7 (*)     Albumin/Globulin Ratio 1.0 (*)     Bilirubin Total 1.4      ALP 88      ALT 13      AST 16      eGFR >60      Anion Gap 6.0      BUN/Creatinine Ratio 16     LIPID PANEL - Abnormal    Cholesterol Total 210 (*)     HDL Cholesterol 40      Triglyceride 112      Cholesterol/HDL Ratio 5      Very Low Density Lipoprotein 22      LDL Cholesterol 148.00 (*)    CBC WITH DIFFERENTIAL - Abnormal    WBC 3.46 (*)     RBC 4.45      Hgb 14.6      Hct 42.2      MCV 94.8 (*)     MCH 32.8 (*)     MCHC 34.6      RDW 12.7      Platelet 168      MPV 8.9      Neut % 61.8      Lymph % 26.6      Mono % 9.0      Eos % 1.7      Basophil % 0.6      Imm Grans % 0.3      Neut # 2.14      Lymph # 0.92      Mono # 0.31      Eos # 0.06      Baso # 0.02      Imm Gran # 0.01      NRBC% 0.0     PROTIME-INR - Abnormal    PT 24.9 (*)     INR 2.1 (*)     Narrative:     Protimes are used to monitor anticoagulant agents such as warfarin. PT INR values are based on the current patient normal mean and the ZARIA value for the specific instrument reagent used.  **Routine theraputic target values for the INR are 2.0-3.0**   TROPONIN I - Normal    Troponin-I <0.010     B-TYPE NATRIURETIC PEPTIDE - Normal    Natriuretic Peptide 98.9     CBC W/ AUTO DIFFERENTIAL    Narrative:     The following orders were created for panel order CBC auto differential.  Procedure                               Abnormality         Status                     ---------                               -----------         ------                     CBC with Differential[[id number]]       Abnormal            Final result                 Please view results for these tests on the individual orders.        ECG Results              EKG 12-LEAD (Final result)        Collection Time Result Time QRS Duration [facility] QTC Calculation    03/17/25 08:43:31  03/17/25 09:58:01 222 501                     Wet Read by Ponce Quintero MD (03/17/25 09:58:40, [facility] - Emergency Dept, Emergency Medicine)    EKG: Independently reviewed and / or Interpreted by Ponce Quintero MD. independently as Rate 65, atrioventricular dual paced rhythm,, No STEMI, no particular acute abnormality.                        Final result by Interface, Lab In [facility] (03/17/25 09:07:28)                   Narrative:    Test Reason : R00.2,    Vent. Rate :  65 BPM     Atrial Rate :  65 BPM     P-R Int : 196 ms          QRS Dur : 222 ms      QT Int : 482 ms       P-R-T Axes :  99 -67 107 degrees    QTcB Int : 501 ms    AV dual-paced rhythm  Abnormal ECG  When compared with ECG of 11-Jan-2023 07:23,  Electronic ventricular pacemaker has replaced Electronic atrial pacemaker  Confirmed by Fabian Raya ([id number]) on 3/17/2025 9:07:21 AM    Referred By: AAAREFERRAL SELF           Confirmed By: Fabian Raya                                  Imaging Results              X-ray Chest AP Portable (Preliminary result)  Result time 03/17/25 09:59:09      Wet Read by Ponce Quintero MD (03/17/25 09:59:09, [facility] - Emergency Dept, Emergency Medicine)    Chest One View:  Independently reviewed and/or interpreted by Ponce Quintero MD.  No Focal Consolidation, No Acute Cardiopulmonary abnormality identified grossly.                                     Medications   potassium chloride SA CR tablet 40 mEq (has no administration in time range)     Medical Decision Making    Nay Doe is 69 y.o. female who  has a past medical history of Allergy, Arthritis, Degenerative disc disease, Diabetes mellitus, Heart murmur, Hyperlipidemia, Hypertension, Obesity, Paroxysmal atrial fibrillation, and Tachycardia. arrives in ER with c/o Palpitations (Brought per [facility] for c/o palpitations since 6 this am)    Patient says that she has paroxysmal AFib, this morning she started having irregular  fast heartbeat, she waited for some time but it was not getting better usually gets better by itself, so she called the ambulance, when they picked her up and brought her to the ambulance it broke then she started feeling good after that, and she wanted them to bring her back to the home but they said they will just bring her to the hospital to get checked.  She denies any complaints whatsoever at this time.    Amount and/or Complexity of Data Reviewed  Labs: ordered.  Radiology: ordered.               ED Course as of 03/17/25 1002   Mon Mar 17, 2025   0959 INR(!): 2.1 [GQ]   0959 Creatinine: 0.91 [GQ]   0959 Potassium: 3.7 [GQ]   0959 CO2: 24 [GQ]   0959 WBC(!): 3.46 [GQ]   0959 Troponin I: <0.010 [GQ]   0959 BNP: 98.9  Patient's workup in the emergency room does not reveal any major abnormality, her heart rate is in 60s paced rhythm, blood pressure is maintained 153/61, her INR is 2.1 she is on Coumadin for DVT according to her and paroxysmal AFib, she is feeling good, actually she did not want to come to the emergency room once her heart started beating fast.  I am going to let her go home with instruction to see her family doctor and come back to the emergency room in case she develops any other symptoms.  Her potassium is 3.7, I will give her a dose of potassium before discharge. [GQ]      ED Course User Index  [GQ] Ponce Quintero MD                           Clinical Impression:  Final diagnoses:  [R00.2] Palpitations  [I48.0] Paroxysmal A-fib (Primary)          ED Disposition Condition    Discharge Stable          ED Prescriptions    None       Follow-up Information       Follow up With Specialties Details Why Contact Info    Dariana Iglesias, [facility], Emergency Medicine In 1 day  [address] [place] [address]  [phone]                   [1]   Social History  Tobacco Use    Smoking status: Never   Substance Use Topics    Alcohol use: No    Drug use: No        Ponce Quintero  MD  03/17/25 1002

## 2025-03-22 ENCOUNTER — HOSPITAL ENCOUNTER (EMERGENCY)
Facility: HOSPITAL | Age: 70
Discharge: HOME | End: 2025-03-22
Attending: EMERGENCY MEDICINE
Payer: MEDICARE

## 2025-03-22 VITALS
WEIGHT: 285 LBS | RESPIRATION RATE: 21 BRPM | SYSTOLIC BLOOD PRESSURE: 144 MMHG | OXYGEN SATURATION: 99 % | HEIGHT: 67 IN | DIASTOLIC BLOOD PRESSURE: 77 MMHG | BODY MASS INDEX: 44.73 KG/M2 | TEMPERATURE: 98 F | HEART RATE: 63 BPM

## 2025-03-22 DIAGNOSIS — M25.512 ACUTE PAIN OF LEFT SHOULDER: ICD-10-CM

## 2025-03-22 DIAGNOSIS — R07.89 CHEST WALL PAIN: Primary | ICD-10-CM

## 2025-03-22 DIAGNOSIS — R07.9 CHEST PAIN: ICD-10-CM

## 2025-03-22 DIAGNOSIS — M75.52 ACUTE BURSITIS OF LEFT SHOULDER: ICD-10-CM

## 2025-03-22 LAB
ALBUMIN SERPL-MCNC: 3.8 G/DL (ref 3.4–4.8)
ALBUMIN/GLOB SERPL: 1 RATIO (ref 1.1–2)
ALP SERPL-CCNC: 89 UNIT/L (ref 40–150)
ALT SERPL-CCNC: 14 UNIT/L (ref 0–55)
ANION GAP SERPL CALC-SCNC: 9 MEQ/L
APTT PPP: 48.9 SECONDS (ref 24.2–35.9)
AST SERPL-CCNC: 18 UNIT/L (ref 11–45)
BASOPHILS # BLD AUTO: 0.03 X10(3)/MCL
BASOPHILS NFR BLD AUTO: 0.6 %
BILIRUB SERPL-MCNC: 1.6 MG/DL
BNP BLD-MCNC: 111.9 PG/ML
BUN SERPL-MCNC: 14 MG/DL (ref 9.8–20.1)
CALCIUM SERPL-MCNC: 10.8 MG/DL (ref 8.4–10.2)
CHLORIDE SERPL-SCNC: 107 MMOL/L (ref 98–107)
CO2 SERPL-SCNC: 22 MMOL/L (ref 23–31)
CREAT SERPL-MCNC: 0.74 MG/DL (ref 0.55–1.02)
CREAT/UREA NIT SERPL: 19
D DIMER PPP IA.FEU-MCNC: <0.27 UG/ML FEU (ref 0–0.5)
EOSINOPHIL # BLD AUTO: 0.06 X10(3)/MCL (ref 0–0.9)
EOSINOPHIL NFR BLD AUTO: 1.3 %
ERYTHROCYTE [DISTWIDTH] IN BLOOD BY AUTOMATED COUNT: 12.6 % (ref 11.5–17)
GFR SERPLBLD CREATININE-BSD FMLA CKD-EPI: >60 ML/MIN/1.73/M2
GLOBULIN SER-MCNC: 3.8 GM/DL (ref 2.4–3.5)
GLUCOSE SERPL-MCNC: 84 MG/DL (ref 82–115)
HCT VFR BLD AUTO: 42.8 % (ref 37–47)
HGB BLD-MCNC: 14.7 G/DL (ref 12–16)
IMM GRANULOCYTES # BLD AUTO: 0.01 X10(3)/MCL (ref 0–0.04)
IMM GRANULOCYTES NFR BLD AUTO: 0.2 %
INR PPP: 2.3
LYMPHOCYTES # BLD AUTO: 1.57 X10(3)/MCL (ref 0.6–4.6)
LYMPHOCYTES NFR BLD AUTO: 32.8 %
MCH RBC QN AUTO: 32.4 PG (ref 27–31)
MCHC RBC AUTO-ENTMCNC: 34.3 G/DL (ref 33–36)
MCV RBC AUTO: 94.3 FL (ref 80–94)
MONOCYTES # BLD AUTO: 0.44 X10(3)/MCL (ref 0.1–1.3)
MONOCYTES NFR BLD AUTO: 9.2 %
NEUTROPHILS # BLD AUTO: 2.68 X10(3)/MCL (ref 2.1–9.2)
NEUTROPHILS NFR BLD AUTO: 55.9 %
NRBC BLD AUTO-RTO: 0 %
PLATELET # BLD AUTO: 184 X10(3)/MCL (ref 130–400)
PMV BLD AUTO: 9.2 FL (ref 7.4–10.4)
POTASSIUM SERPL-SCNC: 3.9 MMOL/L (ref 3.5–5.1)
PROT SERPL-MCNC: 7.6 GM/DL (ref 5.8–7.6)
PROTHROMBIN TIME: 26.1 SECONDS (ref 12.4–14.9)
RBC # BLD AUTO: 4.54 X10(6)/MCL (ref 4.2–5.4)
SODIUM SERPL-SCNC: 138 MMOL/L (ref 136–145)
TROPONIN I SERPL-MCNC: <0.01 NG/ML (ref 0–0.04)
WBC # BLD AUTO: 4.79 X10(3)/MCL (ref 4.5–11.5)

## 2025-03-22 PROCEDURE — 93005 ELECTROCARDIOGRAM TRACING: CPT

## 2025-03-22 PROCEDURE — 85610 PROTHROMBIN TIME: CPT

## 2025-03-22 PROCEDURE — 83880 ASSAY OF NATRIURETIC PEPTIDE: CPT

## 2025-03-22 PROCEDURE — 85025 COMPLETE CBC W/AUTO DIFF WBC: CPT

## 2025-03-22 PROCEDURE — 25000003 PHARM REV CODE 250

## 2025-03-22 PROCEDURE — 99285 EMERGENCY DEPT VISIT HI MDM: CPT | Mod: 25

## 2025-03-22 PROCEDURE — 85730 THROMBOPLASTIN TIME PARTIAL: CPT

## 2025-03-22 PROCEDURE — 85379 FIBRIN DEGRADATION QUANT: CPT

## 2025-03-22 PROCEDURE — 84484 ASSAY OF TROPONIN QUANT: CPT

## 2025-03-22 PROCEDURE — 93010 ELECTROCARDIOGRAM REPORT: CPT | Mod: ,,, | Performed by: INTERNAL MEDICINE

## 2025-03-22 PROCEDURE — 80053 COMPREHEN METABOLIC PANEL: CPT

## 2025-03-22 RX ORDER — HYDROCODONE BITARTRATE AND ACETAMINOPHEN 5; 325 MG/1; MG/1
1 TABLET ORAL
Refills: 0 | Status: DISCONTINUED | OUTPATIENT
Start: 2025-03-22 | End: 2025-03-22

## 2025-03-22 RX ORDER — HYDROCODONE BITARTRATE AND ACETAMINOPHEN 10; 325 MG/1; MG/1
1 TABLET ORAL EVERY 6 HOURS PRN
Qty: 12 TABLET | Refills: 0 | Status: SHIPPED | OUTPATIENT
Start: 2025-03-22

## 2025-03-22 RX ORDER — HYDROCODONE BITARTRATE AND ACETAMINOPHEN 10; 325 MG/1; MG/1
1 TABLET ORAL
Refills: 0 | Status: COMPLETED | OUTPATIENT
Start: 2025-03-22 | End: 2025-03-22

## 2025-03-22 RX ADMIN — HYDROCODONE BITARTRATE AND ACETAMINOPHEN 1 TABLET: 10; 325 TABLET ORAL at 07:03

## 2025-03-22 NOTE — ED PROVIDER NOTES
Encounter Date: 3/22/2025       History     Chief Complaint   Patient presents with    Chest Pain     Chest pain that started today while driving. L chest tender to touch.     HPI  Review of patient's allergies indicates:   Allergen Reactions    Iodine and iodide containing products Shortness Of Breath and Rash    Lortab [hydrocodone-acetaminophen] Other (See Comments)     Severe headaches    Sulfa (sulfonamide antibiotics) Nausea Only     Past Medical History:   Diagnosis Date    Allergy     Arthritis     Degenerative disc disease     2 bulging disc    Diabetes mellitus     Heart murmur     Hyperlipidemia     Hypertension     Obesity     Paroxysmal atrial fibrillation     Tachycardia     on Rhytmol     Past Surgical History:   Procedure Laterality Date    ABLATION OF DYSRHYTHMIC FOCUS      APPENDECTOMY      CHOLECYSTECTOMY       Family History   Problem Relation Name Age of Onset    Cancer Mother      Diabetes Mellitus Mother      Rheum arthritis Mother      Heart disease Father      Diabetes Mellitus Sister      Rheum arthritis Sister      Hypertension Brother      Diabetes Mellitus Sister      Rheum arthritis Sister      Rheum arthritis Sister      Hypertension Brother       Social History[1]  Review of Systems    Physical Exam     Initial Vitals [03/22/25 1733]   BP Pulse Resp Temp SpO2   (!) 159/73 67 20 97.9 °F (36.6 °C) 100 %      MAP       --         Physical Exam    ED Course   Procedures  Labs Reviewed   COMPREHENSIVE METABOLIC PANEL - Abnormal       Result Value    Sodium 138      Potassium 3.9      Chloride 107      CO2 22 (*)     Glucose 84      Blood Urea Nitrogen 14.0      Creatinine 0.74      Calcium 10.8 (*)     Protein Total 7.6      Albumin 3.8      Globulin 3.8 (*)     Albumin/Globulin Ratio 1.0 (*)     Bilirubin Total 1.6 (*)     ALP 89      ALT 14      AST 18      eGFR >60      Anion Gap 9.0      BUN/Creatinine Ratio 19     B-TYPE NATRIURETIC PEPTIDE - Abnormal    Natriuretic Peptide 111.9 (*)     CBC WITH DIFFERENTIAL - Abnormal    WBC 4.79      RBC 4.54      Hgb 14.7      Hct 42.8      MCV 94.3 (*)     MCH 32.4 (*)     MCHC 34.3      RDW 12.6      Platelet 184      MPV 9.2      Neut % 55.9      Lymph % 32.8      Mono % 9.2      Eos % 1.3      Basophil % 0.6      Imm Grans % 0.2      Neut # 2.68      Lymph # 1.57      Mono # 0.44      Eos # 0.06      Baso # 0.03      Imm Gran # 0.01      NRBC% 0.0     PROTIME-INR - Abnormal    PT 26.1 (*)     INR 2.3 (*)     Narrative:     Protimes are used to monitor anticoagulant agents such as warfarin. PT INR values are based on the current patient normal mean and the ZARIA value for the specific instrument reagent used.  **Routine theraputic target values for the INR are 2.0-3.0**   APTT - Abnormal    PTT 48.9 (*)    TROPONIN I - Normal    Troponin-I <0.010     D DIMER, QUANTITATIVE - Normal    D-Dimer <0.27     CBC W/ AUTO DIFFERENTIAL    Narrative:     The following orders were created for panel order CBC auto differential.  Procedure                               Abnormality         Status                     ---------                               -----------         ------                     CBC with Differential[[id number]]       Abnormal            Final result                 Please view results for these tests on the individual orders.   TROPONIN I     EKG Readings: (Independently Interpreted)   Initial Reading: No STEMI. Rhythm: Paced Rhythm. Heart Rate: 64. Ectopy: No Ectopy. Conduction: Normal. Axis: Normal.     ECG Results              EKG 12-lead (In process)        Collection Time Result Time QRS Duration [facility] QTC Calculation    03/22/25 17:29:27 03/22/25 18:28:05 90 404                     In process by Interface, Lab In [facility] (03/22/25 18:28:14)                   Narrative:    Test Reason : R07.9,    Vent. Rate :  64 BPM     Atrial Rate :  64 BPM     P-R Int : 284 ms          QRS Dur :  90 ms      QT Int : 392 ms       P-R-T Axes :     41 268  degrees    QTcB Int : 404 ms    Atrial-paced rhythm with prolonged AV conduction  Anteroseptal infarct ,age undetermined  Marked ST abnormality, possible inferior subendocardial injury  Abnormal ECG  When compared with ECG of 17-Mar-2025 08:43,  Electronic atrial pacemaker has replaced Electronic ventricular pacemaker    Referred By: AAAREFERRAL SELF           Confirmed By:                                   Imaging Results              X-Ray Chest AP Portable (In process)                      Medications - No data to display  Medical Decision Making  Pt is a 68 y/o female with hx of Arthritis, Degenerative disc disease, Diabetes mellitus, Heart murmur, Hyperlipidemia, Hypertension, Obesity, Paroxysmal atrial fibrillation, and Tachycardia    Amount and/or Complexity of Data Reviewed  Labs: ordered. Decision-making details documented in ED Course.  Radiology: ordered. Decision-making details documented in ED Course.  ECG/medicine tests:  Decision-making details documented in ED Course.  Discussion of management or test interpretation with external provider(s): Reviewed pt with Dr. David who advised pt is safe for discharge home.    Risk  Prescription drug management.                                      Clinical Impression:  Final diagnoses:  [R07.9] Chest pain                     [1]   Social History  Tobacco Use    Smoking status: Never   Substance Use Topics    Alcohol use: No    Drug use: No        Arnulfo David MD  03/22/25 2717

## 2025-03-23 NOTE — DISCHARGE INSTRUCTIONS
Tylenol as needed for pain. If no relief, can take a norco. Alternate ice and heat. Follow-up with cardiology as scheduled on Monday, follow-up with orthopedics. Return with new or worsening symptoms.

## 2025-03-24 LAB
OHS QRS DURATION: 90 MS
OHS QTC CALCULATION: 404 MS

## 2025-05-19 ENCOUNTER — HOSPITAL ENCOUNTER (OUTPATIENT)
Dept: RADIOLOGY | Facility: HOSPITAL | Age: 70
Discharge: HOME | End: 2025-05-19
Attending: NURSE PRACTITIONER
Payer: MEDICARE

## 2025-05-19 DIAGNOSIS — Z12.31 ENCOUNTER FOR SCREENING MAMMOGRAM FOR BREAST CANCER: ICD-10-CM

## 2025-05-19 PROCEDURE — 77063 BREAST TOMOSYNTHESIS BI: CPT | Mod: TC

## 2025-05-19 PROCEDURE — 77067 SCR MAMMO BI INCL CAD: CPT | Mod: 26,,, | Performed by: STUDENT IN AN ORGANIZED HEALTH CARE EDUCATION/TRAINING PROGRAM

## 2025-05-19 PROCEDURE — 77063 BREAST TOMOSYNTHESIS BI: CPT | Mod: 26,,, | Performed by: STUDENT IN AN ORGANIZED HEALTH CARE EDUCATION/TRAINING PROGRAM

## 2025-06-05 ENCOUNTER — OFFICE VISIT (OUTPATIENT)
Dept: ORTHOPEDICS | Facility: CLINIC | Age: 70
End: 2025-06-05
Payer: MEDICARE

## 2025-06-05 ENCOUNTER — HOSPITAL ENCOUNTER (OUTPATIENT)
Dept: RADIOLOGY | Facility: CLINIC | Age: 70
Discharge: HOME | End: 2025-06-05
Attending: ORTHOPAEDIC SURGERY
Payer: MEDICARE

## 2025-06-05 VITALS
BODY MASS INDEX: 42.13 KG/M2 | DIASTOLIC BLOOD PRESSURE: 68 MMHG | HEIGHT: 68 IN | WEIGHT: 278 LBS | SYSTOLIC BLOOD PRESSURE: 96 MMHG | HEART RATE: 60 BPM

## 2025-06-05 DIAGNOSIS — M25.569 KNEE PAIN, UNSPECIFIED CHRONICITY, UNSPECIFIED LATERALITY: ICD-10-CM

## 2025-06-05 DIAGNOSIS — M17.0 PRIMARY OSTEOARTHRITIS OF BOTH KNEES: ICD-10-CM

## 2025-06-05 DIAGNOSIS — M25.569 KNEE PAIN, UNSPECIFIED CHRONICITY, UNSPECIFIED LATERALITY: Primary | ICD-10-CM

## 2025-06-05 PROCEDURE — 3288F FALL RISK ASSESSMENT DOCD: CPT | Mod: CPTII,,, | Performed by: ORTHOPAEDIC SURGERY

## 2025-06-05 PROCEDURE — 4010F ACE/ARB THERAPY RXD/TAKEN: CPT | Mod: CPTII,,, | Performed by: ORTHOPAEDIC SURGERY

## 2025-06-05 PROCEDURE — 3008F BODY MASS INDEX DOCD: CPT | Mod: CPTII,,, | Performed by: ORTHOPAEDIC SURGERY

## 2025-06-05 PROCEDURE — 3078F DIAST BP <80 MM HG: CPT | Mod: CPTII,,, | Performed by: ORTHOPAEDIC SURGERY

## 2025-06-05 PROCEDURE — 3074F SYST BP LT 130 MM HG: CPT | Mod: CPTII,,, | Performed by: ORTHOPAEDIC SURGERY

## 2025-06-05 PROCEDURE — 1101F PT FALLS ASSESS-DOCD LE1/YR: CPT | Mod: CPTII,,, | Performed by: ORTHOPAEDIC SURGERY

## 2025-06-05 PROCEDURE — 1159F MED LIST DOCD IN RCRD: CPT | Mod: CPTII,,, | Performed by: ORTHOPAEDIC SURGERY

## 2025-06-05 PROCEDURE — 99203 OFFICE O/P NEW LOW 30 MIN: CPT | Mod: ,,, | Performed by: ORTHOPAEDIC SURGERY

## 2025-06-05 PROCEDURE — 1160F RVW MEDS BY RX/DR IN RCRD: CPT | Mod: CPTII,,, | Performed by: ORTHOPAEDIC SURGERY

## 2025-06-05 RX ORDER — TIRZEPATIDE 12.5 MG/.5ML
12.5 INJECTION, SOLUTION SUBCUTANEOUS WEEKLY
COMMUNITY
Start: 2025-01-28

## 2025-06-05 RX ORDER — NIFEDIPINE 30 MG/1
TABLET, EXTENDED RELEASE ORAL
COMMUNITY

## 2025-06-05 RX ORDER — AMLODIPINE BESYLATE 5 MG/1
5 TABLET ORAL
COMMUNITY

## 2025-06-05 RX ORDER — WARFARIN 7.5 MG/1
TABLET ORAL
COMMUNITY

## 2025-06-05 RX ORDER — VALSARTAN 160 MG/1
160 TABLET ORAL
COMMUNITY

## 2025-06-05 RX ORDER — METOPROLOL SUCCINATE 25 MG/1
25 TABLET, EXTENDED RELEASE ORAL
COMMUNITY

## 2025-06-11 NOTE — PROGRESS NOTES
"Subjective:    CC: Follow-up of the Left Knee (Bilateral knee pain - pt states that knows she is bone on bone, but she is having pain. Does not want injections - they did not work. ) and Follow-up of the Right Knee       HPI:  Patient comes in today complaining of bilateral knee pain.  Patient does have a history of bone-on-bone arthritis.  He has pain with long standing walking and bending, she states this is chronic in nature, she denies any other complaints.  She has had injections without relief in the past.  She denies any new trauma or injury.    ROS: Refer to [facility] for pertinent ROS. All other 12 point systems negative.    Objective:  Vitals:    06/05/25 1349   BP: 96/68   Pulse: 60   Weight: 126.1 kg (278 lb)   Height: 5' 8" (1.727 m)        Physical Exam:  The patient is well-nourished, well-developed, in no apparent distress, pleasant and cooperative. Examination of the bilateral lower extremities,  compartments are soft and warm. Skin is intact. There are no signs or symptoms of DVT or infection. There is no obvious effusion. There is no erythema. Tenderness to palpation along the medial joint line bilaterally, right knee range of motion is 5-90; left knee range of motion is 5-90. The knee is stable to stressing with varus and valgus. Negative anterior and posterior drawer.   Negative Lachman´s.  Negative Sherry's test. Patella grind is positive, negative for apprehension.  Patient is neurovascularly intact distally.      Images:  X-rays three views left and right knee demonstrates bone-on-bone arthritis. Images Reviewed and discussed with patient.    Assessment:  1. Knee pain, unspecified chronicity, unspecified laterality    2. Primary osteoarthritis of both knees        Plan:  At this time we discussed her physical exam and x-ray findings.  We have discussed her underlying arthritis in both knees.  She will continue low-impact activities, knee exercises.  Patient is improving with her BMI.  Patient " understands to call or return sooner if there is any new problems or difficulties.    Follow UP: No follow-ups on file.

## 2025-08-13 DIAGNOSIS — R22.1 LOCALIZED SWELLING, MASS AND LUMP, NECK: Primary | ICD-10-CM
